# Patient Record
Sex: FEMALE | Race: WHITE | NOT HISPANIC OR LATINO | Employment: UNEMPLOYED
[De-identification: names, ages, dates, MRNs, and addresses within clinical notes are randomized per-mention and may not be internally consistent; named-entity substitution may affect disease eponyms.]

---

## 2017-06-03 ENCOUNTER — HEALTH MAINTENANCE LETTER (OUTPATIENT)
Age: 32
End: 2017-06-03

## 2017-08-29 ENCOUNTER — CARE COORDINATION (OUTPATIENT)
Dept: CARE COORDINATION | Facility: CLINIC | Age: 32
End: 2017-08-29

## 2019-07-19 LAB
HBV SURFACE AG SERPL QL IA: NEGATIVE
HIV 1+2 AB+HIV1 P24 AG SERPL QL IA: NON REACTIVE
RUBELLA ANTIBODY IGG QUANTITATIVE: NORMAL IU/ML

## 2020-01-17 LAB — GROUP B STREP PCR: POSITIVE

## 2020-02-05 NOTE — PHARMACY-ADMISSION MEDICATION HISTORY
Medication reconciliation completed by pre-admitting.    Prior to Admission medications    Medication Sig Last Dose Taking? Auth Provider   acetaminophen 650 MG TABS Take 650 mg by mouth every 4 hours as needed for mild pain or fever (greater than or equal to 38  C /100.4  F (oral) or 38.5  C/ 101.4  F (core).)   Shanique Loya MD   Prenatal Vit-Fe Fumarate-FA (PRENATAL MULTIVITAMIN  PLUS IRON) 27-0.8 MG TABS Take 1 tablet by mouth daily   Reported, Patient

## 2020-02-06 ENCOUNTER — HOSPITAL ENCOUNTER (OUTPATIENT)
Dept: LAB | Facility: CLINIC | Age: 35
Discharge: HOME | End: 2020-02-06
Attending: OBSTETRICS & GYNECOLOGY | Admitting: OBSTETRICS & GYNECOLOGY
Payer: MEDICAID

## 2020-02-06 DIAGNOSIS — Z01.812 PRE-OPERATIVE LABORATORY EXAMINATION: ICD-10-CM

## 2020-02-06 LAB
ABO + RH BLD: NORMAL
ABO + RH BLD: NORMAL
BLD GP AB SCN SERPL QL: NORMAL
BLOOD BANK CMNT PATIENT-IMP: NORMAL
HGB BLD-MCNC: 11.4 G/DL (ref 11.7–15.7)
SPECIMEN EXP DATE BLD: NORMAL

## 2020-02-06 PROCEDURE — 86850 RBC ANTIBODY SCREEN: CPT | Performed by: OBSTETRICS & GYNECOLOGY

## 2020-02-06 PROCEDURE — 86901 BLOOD TYPING SEROLOGIC RH(D): CPT | Performed by: OBSTETRICS & GYNECOLOGY

## 2020-02-06 PROCEDURE — 85018 HEMOGLOBIN: CPT | Performed by: OBSTETRICS & GYNECOLOGY

## 2020-02-06 PROCEDURE — 86780 TREPONEMA PALLIDUM: CPT | Performed by: OBSTETRICS & GYNECOLOGY

## 2020-02-06 PROCEDURE — 36415 COLL VENOUS BLD VENIPUNCTURE: CPT | Performed by: OBSTETRICS & GYNECOLOGY

## 2020-02-06 PROCEDURE — 86900 BLOOD TYPING SEROLOGIC ABO: CPT | Performed by: OBSTETRICS & GYNECOLOGY

## 2020-02-07 ENCOUNTER — ANESTHESIA (OUTPATIENT)
Dept: OBGYN | Facility: CLINIC | Age: 35
End: 2020-02-07
Payer: MEDICAID

## 2020-02-07 ENCOUNTER — ANESTHESIA EVENT (OUTPATIENT)
Dept: OBGYN | Facility: CLINIC | Age: 35
End: 2020-02-07
Payer: MEDICAID

## 2020-02-07 ENCOUNTER — HOSPITAL ENCOUNTER (INPATIENT)
Facility: CLINIC | Age: 35
LOS: 3 days | Discharge: HOME HEALTH | End: 2020-02-10
Attending: OBSTETRICS & GYNECOLOGY | Admitting: OBSTETRICS & GYNECOLOGY
Payer: MEDICAID

## 2020-02-07 DIAGNOSIS — Z98.891 S/P CESAREAN SECTION: Primary | ICD-10-CM

## 2020-02-07 LAB — T PALLIDUM AB SER QL: NONREACTIVE

## 2020-02-07 PROCEDURE — 86780 TREPONEMA PALLIDUM: CPT | Performed by: OBSTETRICS & GYNECOLOGY

## 2020-02-07 PROCEDURE — 25000128 H RX IP 250 OP 636: Performed by: OBSTETRICS & GYNECOLOGY

## 2020-02-07 PROCEDURE — 25000128 H RX IP 250 OP 636: Performed by: NURSE ANESTHETIST, CERTIFIED REGISTERED

## 2020-02-07 PROCEDURE — 71000012 ZZH RECOVERY PHASE 1 LEVEL 1 FIRST HR: Performed by: OBSTETRICS & GYNECOLOGY

## 2020-02-07 PROCEDURE — 25000125 ZZHC RX 250: Performed by: OBSTETRICS & GYNECOLOGY

## 2020-02-07 PROCEDURE — 37000009 ZZH ANESTHESIA TECHNICAL FEE, EACH ADDTL 15 MIN: Performed by: OBSTETRICS & GYNECOLOGY

## 2020-02-07 PROCEDURE — 0UB70ZZ EXCISION OF BILATERAL FALLOPIAN TUBES, OPEN APPROACH: ICD-10-PCS | Performed by: OBSTETRICS & GYNECOLOGY

## 2020-02-07 PROCEDURE — 36000056 ZZH SURGERY LEVEL 3 1ST 30 MIN: Performed by: OBSTETRICS & GYNECOLOGY

## 2020-02-07 PROCEDURE — 25000125 ZZHC RX 250: Performed by: NURSE ANESTHETIST, CERTIFIED REGISTERED

## 2020-02-07 PROCEDURE — 25000128 H RX IP 250 OP 636: Performed by: ANESTHESIOLOGY

## 2020-02-07 PROCEDURE — 25800030 ZZH RX IP 258 OP 636: Performed by: OBSTETRICS & GYNECOLOGY

## 2020-02-07 PROCEDURE — 88302 TISSUE EXAM BY PATHOLOGIST: CPT | Mod: 26 | Performed by: OBSTETRICS & GYNECOLOGY

## 2020-02-07 PROCEDURE — 25000132 ZZH RX MED GY IP 250 OP 250 PS 637: Performed by: OBSTETRICS & GYNECOLOGY

## 2020-02-07 PROCEDURE — 36000058 ZZH SURGERY LEVEL 3 EA 15 ADDTL MIN: Performed by: OBSTETRICS & GYNECOLOGY

## 2020-02-07 PROCEDURE — 37000008 ZZH ANESTHESIA TECHNICAL FEE, 1ST 30 MIN: Performed by: OBSTETRICS & GYNECOLOGY

## 2020-02-07 PROCEDURE — 12000000 ZZH R&B MED SURG/OB

## 2020-02-07 PROCEDURE — 88302 TISSUE EXAM BY PATHOLOGIST: CPT | Performed by: OBSTETRICS & GYNECOLOGY

## 2020-02-07 PROCEDURE — 27210794 ZZH OR GENERAL SUPPLY STERILE: Performed by: OBSTETRICS & GYNECOLOGY

## 2020-02-07 RX ORDER — HYDROMORPHONE HYDROCHLORIDE 1 MG/ML
.3-.5 INJECTION, SOLUTION INTRAMUSCULAR; INTRAVENOUS; SUBCUTANEOUS EVERY 10 MIN PRN
Status: DISCONTINUED | OUTPATIENT
Start: 2020-02-07 | End: 2020-02-07

## 2020-02-07 RX ORDER — NALOXONE HYDROCHLORIDE 0.4 MG/ML
.1-.4 INJECTION, SOLUTION INTRAMUSCULAR; INTRAVENOUS; SUBCUTANEOUS
Status: DISCONTINUED | OUTPATIENT
Start: 2020-02-07 | End: 2020-02-08

## 2020-02-07 RX ORDER — NALBUPHINE HYDROCHLORIDE 20 MG/ML
2.5-5 INJECTION, SOLUTION INTRAMUSCULAR; INTRAVENOUS; SUBCUTANEOUS EVERY 6 HOURS PRN
Status: DISCONTINUED | OUTPATIENT
Start: 2020-02-07 | End: 2020-02-07

## 2020-02-07 RX ORDER — ACETAMINOPHEN 325 MG/1
650 TABLET ORAL EVERY 4 HOURS PRN
Status: DISCONTINUED | OUTPATIENT
Start: 2020-02-10 | End: 2020-02-10 | Stop reason: HOSPADM

## 2020-02-07 RX ORDER — BUPIVACAINE HYDROCHLORIDE 7.5 MG/ML
INJECTION, SOLUTION INTRASPINAL PRN
Status: DISCONTINUED | OUTPATIENT
Start: 2020-02-07 | End: 2020-02-07

## 2020-02-07 RX ORDER — FENTANYL CITRATE 50 UG/ML
25-50 INJECTION, SOLUTION INTRAMUSCULAR; INTRAVENOUS EVERY 5 MIN PRN
Status: DISCONTINUED | OUTPATIENT
Start: 2020-02-07 | End: 2020-02-07 | Stop reason: HOSPADM

## 2020-02-07 RX ORDER — ONDANSETRON 2 MG/ML
4 INJECTION INTRAMUSCULAR; INTRAVENOUS EVERY 30 MIN PRN
Status: DISCONTINUED | OUTPATIENT
Start: 2020-02-07 | End: 2020-02-08 | Stop reason: CLARIF

## 2020-02-07 RX ORDER — OXYTOCIN/0.9 % SODIUM CHLORIDE 30/500 ML
100 PLASTIC BAG, INJECTION (ML) INTRAVENOUS CONTINUOUS
Status: DISCONTINUED | OUTPATIENT
Start: 2020-02-07 | End: 2020-02-10 | Stop reason: HOSPADM

## 2020-02-07 RX ORDER — CEFAZOLIN SODIUM 1 G/3ML
1 INJECTION, POWDER, FOR SOLUTION INTRAMUSCULAR; INTRAVENOUS SEE ADMIN INSTRUCTIONS
Status: DISCONTINUED | OUTPATIENT
Start: 2020-02-07 | End: 2020-02-07

## 2020-02-07 RX ORDER — OXYCODONE HYDROCHLORIDE 5 MG/1
5-10 TABLET ORAL
Status: DISCONTINUED | OUTPATIENT
Start: 2020-02-07 | End: 2020-02-10 | Stop reason: HOSPADM

## 2020-02-07 RX ORDER — SIMETHICONE 80 MG
80 TABLET,CHEWABLE ORAL 4 TIMES DAILY PRN
Status: DISCONTINUED | OUTPATIENT
Start: 2020-02-07 | End: 2020-02-10 | Stop reason: HOSPADM

## 2020-02-07 RX ORDER — CEFAZOLIN SODIUM 2 G/100ML
2 INJECTION, SOLUTION INTRAVENOUS
Status: COMPLETED | OUTPATIENT
Start: 2020-02-07 | End: 2020-02-07

## 2020-02-07 RX ORDER — SODIUM CHLORIDE, SODIUM LACTATE, POTASSIUM CHLORIDE, CALCIUM CHLORIDE 600; 310; 30; 20 MG/100ML; MG/100ML; MG/100ML; MG/100ML
INJECTION, SOLUTION INTRAVENOUS CONTINUOUS
Status: DISCONTINUED | OUTPATIENT
Start: 2020-02-07 | End: 2020-02-08 | Stop reason: CLARIF

## 2020-02-07 RX ORDER — FENTANYL CITRATE 50 UG/ML
INJECTION, SOLUTION INTRAMUSCULAR; INTRAVENOUS PRN
Status: DISCONTINUED | OUTPATIENT
Start: 2020-02-07 | End: 2020-02-07

## 2020-02-07 RX ORDER — PRENATAL VIT/IRON FUM/FOLIC AC 27MG-0.8MG
1 TABLET ORAL DAILY
Status: DISCONTINUED | OUTPATIENT
Start: 2020-02-07 | End: 2020-02-10 | Stop reason: HOSPADM

## 2020-02-07 RX ORDER — DEXAMETHASONE SODIUM PHOSPHATE 4 MG/ML
INJECTION, SOLUTION INTRA-ARTICULAR; INTRALESIONAL; INTRAMUSCULAR; INTRAVENOUS; SOFT TISSUE PRN
Status: DISCONTINUED | OUTPATIENT
Start: 2020-02-07 | End: 2020-02-07

## 2020-02-07 RX ORDER — HYDROMORPHONE HYDROCHLORIDE 1 MG/ML
.3-.5 INJECTION, SOLUTION INTRAMUSCULAR; INTRAVENOUS; SUBCUTANEOUS EVERY 30 MIN PRN
Status: DISCONTINUED | OUTPATIENT
Start: 2020-02-07 | End: 2020-02-10 | Stop reason: HOSPADM

## 2020-02-07 RX ORDER — OXYTOCIN/0.9 % SODIUM CHLORIDE 30/500 ML
PLASTIC BAG, INJECTION (ML) INTRAVENOUS PRN
Status: DISCONTINUED | OUTPATIENT
Start: 2020-02-07 | End: 2020-02-07

## 2020-02-07 RX ORDER — ONDANSETRON 2 MG/ML
4 INJECTION INTRAMUSCULAR; INTRAVENOUS EVERY 6 HOURS PRN
Status: DISCONTINUED | OUTPATIENT
Start: 2020-02-07 | End: 2020-02-10 | Stop reason: HOSPADM

## 2020-02-07 RX ORDER — OXYTOCIN/0.9 % SODIUM CHLORIDE 30/500 ML
340 PLASTIC BAG, INJECTION (ML) INTRAVENOUS CONTINUOUS PRN
Status: DISCONTINUED | OUTPATIENT
Start: 2020-02-07 | End: 2020-02-10 | Stop reason: HOSPADM

## 2020-02-07 RX ORDER — HYDROCORTISONE 2.5 %
CREAM (GRAM) TOPICAL 3 TIMES DAILY PRN
Status: DISCONTINUED | OUTPATIENT
Start: 2020-02-07 | End: 2020-02-10 | Stop reason: HOSPADM

## 2020-02-07 RX ORDER — ALBUTEROL SULFATE 0.83 MG/ML
2.5 SOLUTION RESPIRATORY (INHALATION) EVERY 4 HOURS PRN
Status: DISCONTINUED | OUTPATIENT
Start: 2020-02-07 | End: 2020-02-07 | Stop reason: HOSPADM

## 2020-02-07 RX ORDER — CITRIC ACID/SODIUM CITRATE 334-500MG
30 SOLUTION, ORAL ORAL
Status: COMPLETED | OUTPATIENT
Start: 2020-02-07 | End: 2020-02-07

## 2020-02-07 RX ORDER — ACETAMINOPHEN 325 MG/1
975 TABLET ORAL EVERY 8 HOURS
Status: COMPLETED | OUTPATIENT
Start: 2020-02-07 | End: 2020-02-10

## 2020-02-07 RX ORDER — SODIUM CHLORIDE, SODIUM LACTATE, POTASSIUM CHLORIDE, CALCIUM CHLORIDE 600; 310; 30; 20 MG/100ML; MG/100ML; MG/100ML; MG/100ML
INJECTION, SOLUTION INTRAVENOUS CONTINUOUS
Status: DISCONTINUED | OUTPATIENT
Start: 2020-02-07 | End: 2020-02-07

## 2020-02-07 RX ORDER — AMOXICILLIN 250 MG
2 CAPSULE ORAL 2 TIMES DAILY PRN
Status: DISCONTINUED | OUTPATIENT
Start: 2020-02-07 | End: 2020-02-10 | Stop reason: HOSPADM

## 2020-02-07 RX ORDER — MAGNESIUM HYDROXIDE 1200 MG/15ML
LIQUID ORAL PRN
Status: DISCONTINUED | OUTPATIENT
Start: 2020-02-07 | End: 2020-02-10 | Stop reason: HOSPADM

## 2020-02-07 RX ORDER — BISACODYL 10 MG
10 SUPPOSITORY, RECTAL RECTAL DAILY PRN
Status: DISCONTINUED | OUTPATIENT
Start: 2020-02-09 | End: 2020-02-10 | Stop reason: HOSPADM

## 2020-02-07 RX ORDER — MORPHINE SULFATE 1 MG/ML
INJECTION, SOLUTION EPIDURAL; INTRATHECAL; INTRAVENOUS PRN
Status: DISCONTINUED | OUTPATIENT
Start: 2020-02-07 | End: 2020-02-07

## 2020-02-07 RX ORDER — ONDANSETRON 2 MG/ML
INJECTION INTRAMUSCULAR; INTRAVENOUS PRN
Status: DISCONTINUED | OUTPATIENT
Start: 2020-02-07 | End: 2020-02-07

## 2020-02-07 RX ORDER — PHENYLEPHRINE HCL IN 0.9% NACL 1 MG/10 ML
100 SYRINGE (ML) INTRAVENOUS EVERY 5 MIN PRN
Status: DISCONTINUED | OUTPATIENT
Start: 2020-02-07 | End: 2020-02-07

## 2020-02-07 RX ORDER — DEXTROSE, SODIUM CHLORIDE, SODIUM LACTATE, POTASSIUM CHLORIDE, AND CALCIUM CHLORIDE 5; .6; .31; .03; .02 G/100ML; G/100ML; G/100ML; G/100ML; G/100ML
INJECTION, SOLUTION INTRAVENOUS CONTINUOUS
Status: DISCONTINUED | OUTPATIENT
Start: 2020-02-07 | End: 2020-02-10 | Stop reason: HOSPADM

## 2020-02-07 RX ORDER — OXYTOCIN 10 [USP'U]/ML
10 INJECTION, SOLUTION INTRAMUSCULAR; INTRAVENOUS
Status: DISCONTINUED | OUTPATIENT
Start: 2020-02-07 | End: 2020-02-10 | Stop reason: HOSPADM

## 2020-02-07 RX ORDER — NALBUPHINE HYDROCHLORIDE 20 MG/ML
5-10 INJECTION, SOLUTION INTRAMUSCULAR; INTRAVENOUS; SUBCUTANEOUS ONCE
Status: COMPLETED | OUTPATIENT
Start: 2020-02-07 | End: 2020-02-07

## 2020-02-07 RX ORDER — IBUPROFEN 800 MG/1
800 TABLET, FILM COATED ORAL EVERY 6 HOURS PRN
Status: DISCONTINUED | OUTPATIENT
Start: 2020-02-08 | End: 2020-02-10 | Stop reason: HOSPADM

## 2020-02-07 RX ORDER — ONDANSETRON 4 MG/1
4 TABLET, ORALLY DISINTEGRATING ORAL EVERY 30 MIN PRN
Status: DISCONTINUED | OUTPATIENT
Start: 2020-02-07 | End: 2020-02-08 | Stop reason: CLARIF

## 2020-02-07 RX ORDER — DIPHENHYDRAMINE HYDROCHLORIDE 50 MG/ML
25-50 INJECTION INTRAMUSCULAR; INTRAVENOUS EVERY 6 HOURS PRN
Status: DISCONTINUED | OUTPATIENT
Start: 2020-02-07 | End: 2020-02-10 | Stop reason: HOSPADM

## 2020-02-07 RX ORDER — LIDOCAINE 40 MG/G
CREAM TOPICAL
Status: DISCONTINUED | OUTPATIENT
Start: 2020-02-07 | End: 2020-02-10 | Stop reason: HOSPADM

## 2020-02-07 RX ORDER — AMOXICILLIN 250 MG
1 CAPSULE ORAL 2 TIMES DAILY PRN
Status: DISCONTINUED | OUTPATIENT
Start: 2020-02-07 | End: 2020-02-10 | Stop reason: HOSPADM

## 2020-02-07 RX ORDER — LANOLIN 100 %
OINTMENT (GRAM) TOPICAL
Status: DISCONTINUED | OUTPATIENT
Start: 2020-02-07 | End: 2020-02-10 | Stop reason: HOSPADM

## 2020-02-07 RX ORDER — MEPERIDINE HYDROCHLORIDE 50 MG/ML
12.5 INJECTION INTRAMUSCULAR; INTRAVENOUS; SUBCUTANEOUS
Status: DISCONTINUED | OUTPATIENT
Start: 2020-02-07 | End: 2020-02-08 | Stop reason: CLARIF

## 2020-02-07 RX ORDER — LIDOCAINE 40 MG/G
CREAM TOPICAL
Status: DISCONTINUED | OUTPATIENT
Start: 2020-02-07 | End: 2020-02-07

## 2020-02-07 RX ORDER — NALOXONE HYDROCHLORIDE 0.4 MG/ML
.1-.4 INJECTION, SOLUTION INTRAMUSCULAR; INTRAVENOUS; SUBCUTANEOUS
Status: DISCONTINUED | OUTPATIENT
Start: 2020-02-07 | End: 2020-02-07

## 2020-02-07 RX ORDER — KETOROLAC TROMETHAMINE 30 MG/ML
30 INJECTION, SOLUTION INTRAMUSCULAR; INTRAVENOUS EVERY 6 HOURS
Status: COMPLETED | OUTPATIENT
Start: 2020-02-07 | End: 2020-02-08

## 2020-02-07 RX ORDER — PHENYLEPHRINE HYDROCHLORIDE 10 MG/ML
INJECTION INTRAVENOUS PRN
Status: DISCONTINUED | OUTPATIENT
Start: 2020-02-07 | End: 2020-02-07

## 2020-02-07 RX ORDER — NALOXONE HYDROCHLORIDE 0.4 MG/ML
.1-.4 INJECTION, SOLUTION INTRAMUSCULAR; INTRAVENOUS; SUBCUTANEOUS
Status: DISCONTINUED | OUTPATIENT
Start: 2020-02-07 | End: 2020-02-10 | Stop reason: HOSPADM

## 2020-02-07 RX ORDER — FENTANYL CITRATE 50 UG/ML
25-50 INJECTION, SOLUTION INTRAMUSCULAR; INTRAVENOUS
Status: DISCONTINUED | OUTPATIENT
Start: 2020-02-07 | End: 2020-02-08 | Stop reason: CLARIF

## 2020-02-07 RX ADMIN — DEXAMETHASONE SODIUM PHOSPHATE 4 MG: 4 INJECTION, SOLUTION INTRA-ARTICULAR; INTRALESIONAL; INTRAMUSCULAR; INTRAVENOUS; SOFT TISSUE at 07:55

## 2020-02-07 RX ADMIN — BUPIVACAINE HYDROCHLORIDE IN DEXTROSE 12 MG: 7.5 INJECTION, SOLUTION SUBARACHNOID at 07:47

## 2020-02-07 RX ADMIN — KETOROLAC TROMETHAMINE 30 MG: 30 INJECTION, SOLUTION INTRAMUSCULAR at 22:04

## 2020-02-07 RX ADMIN — PHENYLEPHRINE HYDROCHLORIDE 50 MCG: 10 INJECTION INTRAVENOUS at 07:56

## 2020-02-07 RX ADMIN — ONDANSETRON 4 MG: 2 INJECTION INTRAMUSCULAR; INTRAVENOUS at 07:45

## 2020-02-07 RX ADMIN — FENTANYL CITRATE 15 MCG: 50 INJECTION, SOLUTION INTRAMUSCULAR; INTRAVENOUS at 07:47

## 2020-02-07 RX ADMIN — Medication 0.15 MG: at 07:47

## 2020-02-07 RX ADMIN — KETOROLAC TROMETHAMINE 30 MG: 30 INJECTION, SOLUTION INTRAMUSCULAR at 10:08

## 2020-02-07 RX ADMIN — SENNOSIDES AND DOCUSATE SODIUM 1 TABLET: 8.6; 5 TABLET ORAL at 22:04

## 2020-02-07 RX ADMIN — OXYTOCIN-SODIUM CHLORIDE 0.9% IV SOLN 30 UNIT/500ML 500 ML: 30-0.9/5 SOLUTION at 08:11

## 2020-02-07 RX ADMIN — ACETAMINOPHEN 975 MG: 325 TABLET, FILM COATED ORAL at 17:26

## 2020-02-07 RX ADMIN — SODIUM CHLORIDE, SODIUM LACTATE, POTASSIUM CHLORIDE, CALCIUM CHLORIDE AND DEXTROSE MONOHYDRATE: 5; 600; 310; 30; 20 INJECTION, SOLUTION INTRAVENOUS at 12:09

## 2020-02-07 RX ADMIN — KETOROLAC TROMETHAMINE 30 MG: 30 INJECTION, SOLUTION INTRAMUSCULAR at 16:08

## 2020-02-07 RX ADMIN — SODIUM CHLORIDE, POTASSIUM CHLORIDE, SODIUM LACTATE AND CALCIUM CHLORIDE 1000 ML: 600; 310; 30; 20 INJECTION, SOLUTION INTRAVENOUS at 06:15

## 2020-02-07 RX ADMIN — ACETAMINOPHEN 975 MG: 325 TABLET, FILM COATED ORAL at 10:08

## 2020-02-07 RX ADMIN — SODIUM CHLORIDE, POTASSIUM CHLORIDE, SODIUM LACTATE AND CALCIUM CHLORIDE 500 ML: 600; 310; 30; 20 INJECTION, SOLUTION INTRAVENOUS at 13:47

## 2020-02-07 RX ADMIN — PHENYLEPHRINE HYDROCHLORIDE 50 MCG: 10 INJECTION INTRAVENOUS at 08:02

## 2020-02-07 RX ADMIN — SODIUM CITRATE AND CITRIC ACID MONOHYDRATE 30 ML: 500; 334 SOLUTION ORAL at 07:12

## 2020-02-07 RX ADMIN — NALBUPHINE HYDROCHLORIDE 5 MG: 20 INJECTION, SOLUTION INTRAMUSCULAR; INTRAVENOUS; SUBCUTANEOUS at 13:56

## 2020-02-07 RX ADMIN — SODIUM CHLORIDE, POTASSIUM CHLORIDE, SODIUM LACTATE AND CALCIUM CHLORIDE: 600; 310; 30; 20 INJECTION, SOLUTION INTRAVENOUS at 07:12

## 2020-02-07 RX ADMIN — CEFAZOLIN SODIUM 2 G: 2 INJECTION, SOLUTION INTRAVENOUS at 07:31

## 2020-02-07 NOTE — ANESTHESIA POSTPROCEDURE EVALUATION
Patient: Bree Alexis    Procedure(s):  REPEAT  SECTION, WITH BILATERAL SALPINGECTOMY    Diagnosis:Previous  section [Z98.891]  Sterilization [Z30.2]  Diagnosis Additional Information: No value filed.    Anesthesia Type:  Spinal    Note:  Anesthesia Post Evaluation    Patient location during evaluation: OB PACU  Patient participation: Able to participate in evaluation but full recovery from regional anesthesia has not yet ocurrred but is anticipated to occur within 48 hours  Level of consciousness: awake and alert  Pain management: adequate  Airway patency: patent  Cardiovascular status: acceptable  Respiratory status: acceptable  Hydration status: acceptable  PONV: controlled             Last vitals:  There were no vitals filed for this visit.      Electronically Signed By: Remigio Patterson MD  2020  8:58 AM

## 2020-02-07 NOTE — ANESTHESIA PREPROCEDURE EVALUATION
Anesthesia Pre-Procedure Evaluation    Patient: Bree Alexis   MRN: [id number] : 1985          Preoperative Diagnosis: Previous  section [Z98.891]  Sterilization [Z30.2]    Procedure(s):  REPEAT  SECTION, WITH BILATERAL SALPINGECTOMY    History reviewed. No pertinent past medical history.  Past Surgical History:   Procedure Laterality Date      SECTION        SECTION N/A 2016    Procedure:  SECTION;  Surgeon: Mell Omer MD;  Location: [facility] L+[facility]     TONSILLECTOMY & ADENOIDECTOMY       Anesthesia Evaluation     . Pt has had prior anesthetic. Type: Regional    No history of anesthetic complications          ROS/MED HX    ENT/Pulmonary:  - neg pulmonary ROS    (-) asthma   Neurologic:  - neg neurologic ROS    (-) Other neuro hx and Neuropathy   Cardiovascular:  - neg cardiovascular ROS      (-) hypertension and syncope   METS/Exercise Tolerance:     Hematologic:  - neg hematologic  ROS       Musculoskeletal:  - neg musculoskeletal ROS       GI/Hepatic:  - neg GI/hepatic ROS       Renal/Genitourinary:  - ROS Renal section negative       Endo:     (+) Obesity, .      Psychiatric:         Infectious Disease:         Malignancy:      - no malignancy   Other:    (+) Possibly pregnant                         Physical Exam  Normal systems: cardiovascular, pulmonary and dental    Airway   Mallampati: II  TM distance: >3 FB  Neck ROM: full    Dental     Cardiovascular       Pulmonary             Lab Results   Component Value Date    WBC 6.0 10/16/2014    HGB 11.4 (L) 2020    HCT 39.8 10/16/2014     10/16/2014     10/16/2014    POTASSIUM 3.8 10/16/2014    CHLORIDE 106 10/16/2014    CO2 25 10/16/2014    BUN 10 10/16/2014    CR 0.70 10/16/2014    GLC 85 10/16/2014    JASMIN 9.2 10/16/2014    MAG 1.9 2012    ALBUMIN 4.4 10/16/2014    PROTTOTAL 7.8 10/16/2014    ALT 34 10/16/2014    AST 21 10/16/2014    ALKPHOS 47 10/16/2014    BILITOTAL 0.8 10/16/2014     "PTT 24 07/06/2007    INR 1.04 07/06/2007    TSH 0.67 10/16/2014    HCGS Negative 07/06/2007       Preop Vitals  BP Readings from Last 3 Encounters:   02/19/16 134/88   12/18/14 110/70   10/16/14 120/74    Pulse Readings from Last 3 Encounters:   02/19/16 74   12/18/14 54   10/16/14 74      Resp Readings from Last 3 Encounters:   02/19/16 16   12/18/14 10   10/16/14 14    SpO2 Readings from Last 3 Encounters:   02/17/16 96%   06/14/13 98%   07/03/12 98%      Temp Readings from Last 1 Encounters:   02/19/16 97.9  F (36.6  C) (Oral)    Ht Readings from Last 1 Encounters:   02/09/16 1.651 m (5' 5\")      Wt Readings from Last 1 Encounters:   02/09/16 111.1 kg (245 lb)    Estimated body mass index is 40.77 kg/m  as calculated from the following:    Height as of 2/9/16: 1.651 m (5' 5\").    Weight as of 2/9/16: 111.1 kg (245 lb).       Anesthesia Plan      History & Physical Review  History and physical reviewed and following examination; no interval change.    ASA Status:  2 .    NPO Status:  > 8 hours    Plan for Spinal   PONV prophylaxis:  Ondansetron (or other 5HT-3) and Dexamethasone or Solumedrol       Postoperative Care  Postoperative pain management:  IV analgesics and Neuraxial analgesia.      Consents  Anesthetic plan, risks, benefits and alternatives discussed with:  Patient..                 Remigio Patterson MD                    .  "

## 2020-02-07 NOTE — PROVIDER NOTIFICATION
02/07/20 1130   Provider Notification   Provider Name/Title Dr. DEE Omer   Method of Notification Phone   Request Evaluate-Remote   Notification Reason Status Update   MD updated on patient itching.  Orders received for either Nubain 5-10mg once prn or benadryl 25-50 mg prn.

## 2020-02-07 NOTE — PLAN OF CARE
Vital signs stable on room air. Bonding well with infant. Pt has had many visitors today.  at bedside and supportive. Breastfeeding well with minimal assistance from staff. Tolerating clear liquids and saltine crackers, diet advanced, pt educated about eating bland and easy foods, denies nausea. Pt itchy, nubain given. Pt had insufficient output in her hammer, md notified and a bolus of 500 ml LR was given per MD orders, output has increased. Pt has not ambulated yet. Some moist drainage under tegaderm.

## 2020-02-07 NOTE — ANESTHESIA CARE TRANSFER NOTE
Patient: Bree Alexis    Procedure(s):  REPEAT  SECTION, WITH BILATERAL SALPINGECTOMY    Diagnosis: Previous  section [Z98.891]  Sterilization [Z30.2]  Diagnosis Additional Information: No value filed.    Anesthesia Type:   Spinal     Note:  Airway :Room Air  Patient transferred to:Labor and Delivery  Comments: VSS.  Spontaneously breathing room air.  Report given to RNHandoff Report: Identifed the Patient, Identified the Reponsible Provider, Reviewed the pertinent medical history, Discussed the surgical course, Reviewed Intra-OP anesthesia mangement and issues during anesthesia, Set expectations for post-procedure period and Allowed opportunity for questions and acknowledgement of understanding      Vitals: (Last set prior to Anesthesia Care Transfer)    CRNA VITALS  2020 0817 - 2020 0852      2020             Pulse:  75    SpO2:  96 %                Electronically Signed By: CALI Stern CRNA  2020  8:52 AM

## 2020-02-07 NOTE — ANESTHESIA PROCEDURE NOTES
Peripheral nerve/Neuraxial procedure note : intrathecal  Pre-Procedure  Performed by Remigio Patterson MD  Location: OB    Procedure Times:2/7/2020 7:34 AM and 2/7/2020 7:47 AM  Pre-Anesthestic Checklist: patient identified, IV checked, risks and benefits discussed, informed consent, monitors and equipment checked, pre-op evaluation and at physician/surgeon's request    Timeout  Correct Patient: Yes   Correct Procedure: Yes   Correct Site: Yes   Correct Laterality: N/A   Correct Position: Yes   Site Marked: N/A   .   Procedure Documentation  ASA 2  Diagnosis:c section.    Procedure: intrathecal, .   Patient Position:sitting Insertion Site:L4-5  (midline approach)     Patient Prep/Sterile Barriers; mask, sterile gloves, povidone-iodine 7.5% surgical scrub, patient draped.  .  Needle:  Spinal Needle (gauge): 25  Spinal/LP Needle Length (inches): 3.5 # of attempts: 3 (CSF on attempt 1 and 2 without good CSF flow) and # of redirects:  Introducer used Introducer: 20 G .        Assessment/Narrative  Paresthesias: No.  .  .  clear CSF fluid removed .

## 2020-02-07 NOTE — BRIEF OP NOTE
[facility] Brief Operative Note    Pre-operative diagnosis: Previous  section, desiring permanent sterilization  Sterilization [Z30.2]   Post-operative diagnosis Same   Procedure: Procedure(s):  REPEAT  SECTION, WITH BILATERAL SALPINGECTOMY   Surgeon(s): Surgeon(s) and Role:     * Terri Omer MD - Primary     * Juliana Morrison PA-C   Estimated blood loss: 914 cc   Specimens: ID Type Source Tests Collected by Time Destination   1 :  Cord blood Blood OR DOCUMENTATION ONLY Terri Omer MD 2020  8:09 AM    2 :  Tissue Fallopian Tube, Bilateral SEE PROVIDERS ORDERS Terri Omer MD 2020  8:26 AM       Findings: Liveborn male infant, 9#1, 8/9 Apgars

## 2020-02-07 NOTE — OP NOTE
Procedure Date: 2020      PREOPERATIVE DIAGNOSIS:   A 39-week gestation, previous  section, desiring permanent sterilization.      POSTOPERATIVE DIAGNOSIS:   A 39-week gestation, previous  section, desiring permanent sterilization.      PROCEDURE:  Repeat low transverse  section, bilateral salpingectomy.      SURGEON:  Terri Omer MD      ASSISTANT:  ALICIA Matias, [facility]      ANESTHESIA:  Spinal.      QUANTITATIVE BLOOD LOSS:  914 mL.      FINDINGS:  Liveborn male infant, weight was 9 pounds 1 ounce, Apgars were 8 and 9 at 1 and 5 minutes respectively.  The uterus, tubes and ovaries appeared normal.      HISTORY:  The patient is a 34-year-old  3, para 2 who presents at 39 weeks' gestation for a scheduled repeat  section.  Her prenatal course was uncomplicated.  Nonstress test was reactive on presentation.  She had been counseled on permanent sterilization and desired bilateral salpingectomy.  Risks of permanence, irreversibility and risk of failure were discussed and informed consent was obtained.      PROCEDURE:  After obtaining informed consent, the patient was taken to the operating room where she was prepped and draped in the usual sterile fashion and placed in the dorsal supine position with a leftward tilt.  A Pfannenstiel skin incision was made through the prior scar with a scalpel.  Cautery was used to dissect down to fascia.  The fascia was incised at midline, and the fascial incision was extended laterally with Koenig scissors.  The rectus muscles were noted to be encased in a fibrous scar.  This was taken down with cautery.  The superior edge of the fascia was grasped with Kocher clamps and elevated and the rectus muscles were cauterized to separate from fascia.  This was repeated inferiorly.  The rectus muscles were  at midline with blunt dissection and the peritoneum was entered with blunt dissection.  The peritoneal incision was extended with  cautery.  The bladder was noted to be well off of the lower uterine segment.  A transverse lower uterine segment smile incision was made with a scalpel until clear amniotic fluid was noted.  The uterine incision was extended using gentle traction in the cephalocaudal direction.  The fetal vertex was elevated out of the pelvis.  Due to its large size, it was tight in delivering through the incision.  Using gentle fundal pressure, initial attempts to deliver the head were unsuccessful.  The Kiwi vacuum was then called for.  The Kiwi Omni cup vacuum was then placed at the mid sagittal suture just anterior to the posterior fontanelle.  Suction was applied to the green zone and an exam was performed to confirm placement.  Using gentle fundal pressure with a single attempt, the fetal vertex delivered easily through the incision and the  vacuum was released.  There was no nuchal cord.  The anterior and posterior shoulders delivered easily with the remainder of the body following spontaneously.  The infant was placed on the patient's abdomen where he was immediately vigorous and crying.  Cord clamping was delayed by 1 minute, at which time the cord was doubly clamped and cut and the infant was transferred to the warmer.  The placenta was expressed and found to be intact with a 3-vessel cord.  The uterus was cleared of all clots and debris.  Due to the large abdominal pannus the uterus was exteriorized.  The uterine incision was reapproximated with 0 Vicryl in a continuous locked fashion.  A second imbricating layer was placed with 0 Vicryl.  The tubes and ovaries appeared normal.  The patient's desire for permanent sterilization was confirmed.  The right fallopian tube was elevated with a Brimson clamp and the mesosalpinx was cauterized with the handheld LigaSure device.  Sequential bites were taken along the mesosalpinx.  The right fallopian tube was then transected at the cornua.  This was repeated for the left fallopian  tube where the mesosalpinx was cauterized and transected with the LigaSure device.  The left fallopian tube was then transected at the cornua.  The uterus was returned to the abdomen and the gutters were cleared of all clots and debris.  The subfascial layers were inspected and noted to be hemostatic.  The fascia was then closed with 0 Vicryl in 2 running lengths.  Subcutaneous layer was closed with 2-0 Vicryl.  The skin was then closed with Insorb staples.  The patient tolerated the procedure without difficulty.  Sponge, lap and needle counts were correct.  The physician assistant's help was necessary due to the repeat nature of the  section as well as maternal obesity; assistance was needed with retraction.         JO ANN TONEY MD             D: 2020   T: 2020   MT: VANGIE      Name:     JULEE TEMPLE   MRN:      [id number]        Account:        [id number]   :      1985           Procedure Date: 2020      Document: [id number]

## 2020-02-07 NOTE — PLAN OF CARE
Patient here for repeat . Medical history reviewed. Labs were drawn yesterday. Category 1 tracing. No contractions.

## 2020-02-08 LAB — HGB BLD-MCNC: 9.8 G/DL (ref 11.7–15.7)

## 2020-02-08 PROCEDURE — 12000000 ZZH R&B MED SURG/OB

## 2020-02-08 PROCEDURE — 25000132 ZZH RX MED GY IP 250 OP 250 PS 637: Performed by: OBSTETRICS & GYNECOLOGY

## 2020-02-08 PROCEDURE — 85018 HEMOGLOBIN: CPT | Performed by: OBSTETRICS & GYNECOLOGY

## 2020-02-08 PROCEDURE — 25000128 H RX IP 250 OP 636: Performed by: OBSTETRICS & GYNECOLOGY

## 2020-02-08 PROCEDURE — 36415 COLL VENOUS BLD VENIPUNCTURE: CPT | Performed by: OBSTETRICS & GYNECOLOGY

## 2020-02-08 RX ADMIN — IBUPROFEN 800 MG: 800 TABLET ORAL at 17:29

## 2020-02-08 RX ADMIN — ACETAMINOPHEN 975 MG: 325 TABLET, FILM COATED ORAL at 18:40

## 2020-02-08 RX ADMIN — PRENATAL VITAMINS-IRON FUMARATE 27 MG IRON-FOLIC ACID 0.8 MG TABLET 1 TABLET: at 10:30

## 2020-02-08 RX ADMIN — ACETAMINOPHEN 975 MG: 325 TABLET, FILM COATED ORAL at 10:30

## 2020-02-08 RX ADMIN — OXYCODONE HYDROCHLORIDE 5 MG: 5 TABLET ORAL at 21:46

## 2020-02-08 RX ADMIN — ACETAMINOPHEN 975 MG: 325 TABLET, FILM COATED ORAL at 02:02

## 2020-02-08 RX ADMIN — IBUPROFEN 800 MG: 800 TABLET ORAL at 10:30

## 2020-02-08 RX ADMIN — KETOROLAC TROMETHAMINE 30 MG: 30 INJECTION, SOLUTION INTRAMUSCULAR at 04:08

## 2020-02-08 RX ADMIN — OXYCODONE HYDROCHLORIDE 5 MG: 5 TABLET ORAL at 15:20

## 2020-02-08 RX ADMIN — SENNOSIDES AND DOCUSATE SODIUM 1 TABLET: 8.6; 5 TABLET ORAL at 21:16

## 2020-02-08 RX ADMIN — OXYCODONE HYDROCHLORIDE 5 MG: 5 TABLET ORAL at 18:45

## 2020-02-08 NOTE — PLAN OF CARE
Patients mobililty level scored using the bedside mobility assistance tool (BMAT). Patient is at a mobility level test number: 3. Mobility equipment used: none required. Required assist of 1 staff members. Further use of BMAT scoring required.Patient was up and ambulated to the restroom and had first post delivery void of 250 ml. Patient tolerated it well, no pain dizziness or lightheadedness. Patient advised to call for assistance to use the restroom.

## 2020-02-08 NOTE — PLAN OF CARE
Patient VS stable and WNL. Patient is ambulating and voiding independently  Patient is resting with no further requests at this time.  Tegaderm dressing changed at change of shift per MD request.

## 2020-02-08 NOTE — PLAN OF CARE
Data: Vital signs within normal limits. Postpartum checks within normal limits, see flow record. Patient eating and drinking normally. Patient able to empty bladder independently and is up ambulating. No apparent signs of infection. Incision healing well, there is some serosanguinous drainage but covered by the barrier film. Abdominal binder given for support. Patient performing self cares and is able to care for infant.  Action: Patient medicated during the shift for pain and cramping. See MAR. Patient reassessed within 1 hour after each medication and pain was improved, patient stated she was comfortable. Interdry applied over incision to keep area dry due to pannus overlapping incision. Patient education done about safe infant sleep,breastfeeding cues and satiety, pain management, and self cares. See flow record.  Response: Positive attachment behaviors observed with infant. Support person Jaime present.   Plan: Anticipate discharge on 02/10/2020. Patient interested in early discharge, if possible.     Patients mobililty level scored using the bedside mobility assistance tool (BMAT). Patient is at a mobility level test number: 4. Mobility equipment used: none required. Required assist of 1 staff members. Further use of BMAT scoring not required. Patient advised to call for bathroom assistance due to pneumatic compression boots and pulse oximeter being in place.

## 2020-02-08 NOTE — PLAN OF CARE
Vital signs stable on room air. Bonding well with infant.  at bedside and supportive. Breastfeeding well with minimal assistance from staff. Ambulating independently. Tolerating a regular diet. Tegaderm changed per MD request. Voiding spontaneously. Incision is well approximated.

## 2020-02-08 NOTE — PROGRESS NOTES
[facility]   Obstetrics Progress Note    Subjective: This is the patient's first day since  delivery. She is doing well.  She is urinating on her own. Pain is controlled with medication.    Objective:   All vitals stable  Temp: 98.2  F (36.8  C) Temp src: Oral BP: 110/61 Pulse: 67   Resp: 16 SpO2: 97 % O2 Device: None (Room air)      EXAM:  Constitutional: healthy, alert, no distress.   Abdomen: Abdomen soft, non-tender. BS normal. No masses, fundus is firm.  JOINT/EXTREMITIES: extremities normal     Last hemoglobin was   Hemoglobin   Date Value Ref Range Status   2020 9.8 (L) 11.7 - 15.7 g/dL Final   ]    Assessment: Stable postpartum course.    Plan: Routine care. Ambulation encouraged  Breast feeding strategies discussed  Pain control measures as needed  Reportable signs and symptoms dicussed with the patient  Anticipate discharge tomorrow    Rachele Mejias MD

## 2020-02-09 PROCEDURE — 25000132 ZZH RX MED GY IP 250 OP 250 PS 637: Performed by: OBSTETRICS & GYNECOLOGY

## 2020-02-09 PROCEDURE — 40000083 ZZH STATISTIC IP LACTATION SERVICES 1-15 MIN

## 2020-02-09 PROCEDURE — 12000000 ZZH R&B MED SURG/OB

## 2020-02-09 RX ORDER — AMOXICILLIN 250 MG
1 CAPSULE ORAL 2 TIMES DAILY PRN
Qty: 30 TABLET | Refills: 0 | Status: SHIPPED | OUTPATIENT
Start: 2020-02-09 | End: 2023-07-17

## 2020-02-09 RX ORDER — IBUPROFEN 800 MG/1
800 TABLET, FILM COATED ORAL EVERY 8 HOURS PRN
Qty: 20 TABLET | Refills: 0 | Status: SHIPPED | OUTPATIENT
Start: 2020-02-09

## 2020-02-09 RX ORDER — ACETAMINOPHEN 325 MG/1
650 TABLET ORAL EVERY 6 HOURS PRN
Qty: 1 BOTTLE | Refills: 0 | Status: SHIPPED | OUTPATIENT
Start: 2020-02-09

## 2020-02-09 RX ORDER — OXYCODONE HYDROCHLORIDE 5 MG/1
5-10 TABLET ORAL
Qty: 15 TABLET | Refills: 0 | Status: ON HOLD | OUTPATIENT
Start: 2020-02-09 | End: 2020-02-19

## 2020-02-09 RX ADMIN — OXYCODONE HYDROCHLORIDE 5 MG: 5 TABLET ORAL at 14:46

## 2020-02-09 RX ADMIN — IBUPROFEN 800 MG: 800 TABLET ORAL at 00:34

## 2020-02-09 RX ADMIN — OXYCODONE HYDROCHLORIDE 5 MG: 5 TABLET ORAL at 19:58

## 2020-02-09 RX ADMIN — ACETAMINOPHEN 975 MG: 325 TABLET, FILM COATED ORAL at 11:36

## 2020-02-09 RX ADMIN — IBUPROFEN 800 MG: 800 TABLET ORAL at 13:05

## 2020-02-09 RX ADMIN — ACETAMINOPHEN 975 MG: 325 TABLET, FILM COATED ORAL at 19:08

## 2020-02-09 RX ADMIN — OXYCODONE HYDROCHLORIDE 5 MG: 5 TABLET ORAL at 01:06

## 2020-02-09 RX ADMIN — IBUPROFEN 800 MG: 800 TABLET ORAL at 06:30

## 2020-02-09 RX ADMIN — ACETAMINOPHEN 975 MG: 325 TABLET, FILM COATED ORAL at 03:15

## 2020-02-09 RX ADMIN — SENNOSIDES AND DOCUSATE SODIUM 1 TABLET: 8.6; 5 TABLET ORAL at 19:07

## 2020-02-09 RX ADMIN — SENNOSIDES AND DOCUSATE SODIUM 2 TABLET: 8.6; 5 TABLET ORAL at 10:29

## 2020-02-09 RX ADMIN — PRENATAL VITAMINS-IRON FUMARATE 27 MG IRON-FOLIC ACID 0.8 MG TABLET 1 TABLET: at 10:28

## 2020-02-09 RX ADMIN — IBUPROFEN 800 MG: 800 TABLET ORAL at 19:07

## 2020-02-09 NOTE — PLAN OF CARE
Vital signs stable on room air.  at bedside this morning and supportive. Bonding well with infant. Breastfeeding well with some assistance from staff with positioning and teaching on how to use the nipple shield. Pain adequately controled with ibuprofen and tylenol, pt aware oxycodone is available if needed. Pt showered. RN applied steri-strips over incision which is well approximated and inter-dry placed in skin fold. Tolerating a regular diet. Ambulating independently. Voiding spontaneously.

## 2020-02-09 NOTE — PROGRESS NOTES
Doing well.    vss afebrile   Abdomen soft and nt  Fundus firm and nt  Incision dry and intact  Extremities nl    Hgb= 9.8    A: POD 2 s/p repeat LTCS and BS     Doing well     Anemia= stable and asymptomatic  P: Discharge home later this afternoon      RTC 6 weeks      Precautions reviewed

## 2020-02-09 NOTE — LACTATION NOTE
Lactation visit.  noted by MD to be tongue tied, not clipped at this time. Nipples painful and damaged, per patient. Patient did not want LC to assess at this time. She appeared to be resting at time of visit. Declined assistance with breast feeding . He was circumcised this morning and was sleeping at time of visit. She states that she attempted to breast feed her first two children, but she was using formula prior to discharge from the hospital. This  has been feeding better than her other two, besides the tongue tie. She is using a nipple shield to help with damage and pain. She denies any questions at this time. Encouraged her to call for assistance with feeding as needed.

## 2020-02-09 NOTE — PLAN OF CARE
Data: Vital signs within normal limits. Postpartum checks within normal limits, see flow record. Patient eating and drinking normally. Patient able to empty bladder independently and is up ambulating. No apparent signs of infection. Incision healing well, new dressing applied due to peeling off.  Patient performing self cares and is able to care for infant.   Action: Patient medicated during the shift for pain and cramping. See MAR. Patient reassessed within 1 hour after each medication and pain was improved, patient stated she was comfortable. Patient education done about self cares, pain management, breastfeeding cues, satiety, and second night behaviors of infant. See flow record.  Response: Positive attachment behaviors observed with infant. Support person Jaime present.   Plan: Anticipate discharge on 02/10/2020.

## 2020-02-10 VITALS
SYSTOLIC BLOOD PRESSURE: 139 MMHG | OXYGEN SATURATION: 100 % | DIASTOLIC BLOOD PRESSURE: 85 MMHG | HEART RATE: 79 BPM | TEMPERATURE: 98.1 F | RESPIRATION RATE: 18 BRPM

## 2020-02-10 LAB — COPATH REPORT: NORMAL

## 2020-02-10 PROCEDURE — 25000132 ZZH RX MED GY IP 250 OP 250 PS 637: Performed by: OBSTETRICS & GYNECOLOGY

## 2020-02-10 RX ADMIN — ACETAMINOPHEN 975 MG: 325 TABLET, FILM COATED ORAL at 04:55

## 2020-02-10 RX ADMIN — IBUPROFEN 800 MG: 800 TABLET ORAL at 07:30

## 2020-02-10 RX ADMIN — OXYCODONE HYDROCHLORIDE 5 MG: 5 TABLET ORAL at 04:58

## 2020-02-10 RX ADMIN — IBUPROFEN 800 MG: 800 TABLET ORAL at 13:01

## 2020-02-10 RX ADMIN — PRENATAL VITAMINS-IRON FUMARATE 27 MG IRON-FOLIC ACID 0.8 MG TABLET 1 TABLET: at 13:00

## 2020-02-10 RX ADMIN — OXYCODONE HYDROCHLORIDE 5 MG: 5 TABLET ORAL at 13:27

## 2020-02-10 RX ADMIN — IBUPROFEN 800 MG: 800 TABLET ORAL at 01:00

## 2020-02-10 NOTE — DISCHARGE INSTRUCTIONS
Postop  Birth Instructions    Lactation: [phone]  Home Care: [phone]    Activity       Do not lift more than 10 pounds for 6 weeks after surgery.  Ask family and friends for help when you need it.    No driving until you have stopped taking your pain medications (usually two weeks after surgery).    No heavy exercise or activity for 6 weeks.  Don't do anything that will put a strain on your surgery site.    Don't strain when using the toilet.  Your care team may prescribe a stool softener if you have problems with your bowel movements.     To care for your incision:       Keep the incision clean and dry.    Do not soak your incision in water. No swimming or hot tubs until it has fully healed. You may soak in the bathtub if the water level is below your incision.    Do not use peroxide, gel, cream, lotion, or ointment on your incision.    Adjust your clothes to avoid pressure on your surgery site (check the elastic in your underwear for example).     You may see a small amount of clear or pink drainage and this is normal.  Check with your health care provider:       If the drainage increases or has an odor.    If the incision reddens, you have swelling, or develop a rash.    If you have increased pain and the medicine we prescribed doesn't help.    If you have a fever above 100.4 F (38 C) with or without chills when placing thermometer under your tongue.   The area around your incision (surgery wound), will feel numb.  This is normal. The numbness should go away in less than a year.     Keep your hands clean:  Always wash your hands before touching your incision (surgery wound). This helps reduce your risk of infection. If your hands aren't dirty, you may use an alcohol hand-rub to clean your hands. Keep your nails clean and short.    Call your healthcare provider if you have any of these symptoms:       You soak a sanitary pad with blood within 1 hour, or you see blood clots larger than a golf  ball.    Bleeding that lasts more than 6 weeks.    Vaginal discharge that smells bad.    Severe pain, cramping or tenderness in your lower belly area.    A need to urinate more frequently (use the toilet more often), more urgently (use the toilet very quickly), or it burns when you urinate.    Nausea and vomiting.    Redness, swelling or pain around a vein in your leg.    Problems breastfeeding or a red or painful area on your breast.    Chest pain and cough or are gasping for air.    Problems with coping with sadness, anxiety or depression. If you have concerns about hurting yourself or the baby, call your provider immediately.      You have questions or concerns after you return home.

## 2020-02-10 NOTE — PROGRESS NOTES
POD3  Doing well, breastfeeding and supplementing  /65   Pulse 84   Temp 97.6  F (36.4  C) (Oral)   Resp 18   SpO2 100%   Breastfeeding Unknown    NAD  Abd Soft, ND  Inc: CDI  Ext Tr edema    POD3 s/p RLTCS BTL  Routine care  Discharge today  F/u 6 wks  Terri Omer MD

## 2020-02-10 NOTE — PLAN OF CARE
Pt did decide to stay until tomorrow vs discharge today, MD aware. Vitals stable. Pain well controlled with Ibuprofen, Tylenol, occasional Oxycodone. Incision well approximated, steri-strips and interdry in place.  and mother at bedside and attentive to pt. Anticipating discharge to home tomorrow.

## 2020-02-10 NOTE — PLAN OF CARE
Data: Vital signs within normal limits. Postpartum checks within normal limits - see flow record. Patient eating and drinking normally. Patient able to empty bladder independently and is up ambulating. No apparent signs of infection. Incision healing well. Patient performing self cares and is able to care for infant.  Action: Patient medicated during the shift for pain and cramping. See MAR. Patient reassessed within 1 hour after each medication and pain was improved - patient stated she was comfortable. Patient education done about discharge. See flow record.  Response: Positive attachment behaviors observed with infant. Support persons are present, pt's mother is available.     Discharge instructions were discussed and all questions and concerns addressed. Pt discharged with  and infant in private transportation to home at 1400.

## 2020-02-10 NOTE — PLAN OF CARE
Pain managed with ibuprofen, tylenol, and oxycodone. VSS, up adlib, and voiding without difficulty. Steristrips intact with no drainage at incision. Breastfeeding and using shield without assistance. Bonding well with infant and independent with cares. Anticipated discharge today.

## 2020-02-10 NOTE — LACTATION NOTE
This note was copied from a baby's chart.  LC visit. Her baby has been using a nipple shield to latch and is the first infant that she has been successful with latching.  No latch observed as he was sleeping and had recently fed. She is offering formula after nursing to give him additional calories due to weight loss. She has not been pumping, so  recommended pumping post feeds until her full milk comes in and she is able to pump at least 10 mL after nursing.   reviewed how to wean off the nipple shield, offered OP visits if she needs assistance following discharge, and encouraged her to call prn.

## 2020-02-10 NOTE — PROGRESS NOTES
Public Health Nurse (PHN) met with patient, discussed Public Health Nursing Intake phone number for [facility]. Patient reports no questions or concerns at this time.

## 2020-02-14 NOTE — DISCHARGE SUMMARY
Bree is a 33 yo  who underwent a repeat LTCS and BS on 20. There were no complications during  the surgery. Bree did well post op.     She was discharged on POD 3.    She was given pain medications, precautions reviewed and will RTC 6 weeks.

## 2020-02-17 ENCOUNTER — APPOINTMENT (OUTPATIENT)
Dept: ULTRASOUND IMAGING | Facility: CLINIC | Age: 35
End: 2020-02-17
Attending: EMERGENCY MEDICINE
Payer: MEDICAID

## 2020-02-17 ENCOUNTER — HOSPITAL ENCOUNTER (OUTPATIENT)
Facility: CLINIC | Age: 35
Setting detail: OBSERVATION
Discharge: HOME | End: 2020-02-19
Attending: EMERGENCY MEDICINE | Admitting: OBSTETRICS & GYNECOLOGY
Payer: MEDICAID

## 2020-02-17 DIAGNOSIS — N71.9 ENDOMETRITIS: Primary | ICD-10-CM

## 2020-02-17 LAB
ANION GAP SERPL CALCULATED.3IONS-SCNC: 4 MMOL/L (ref 3–14)
BASOPHILS # BLD AUTO: 0 10E9/L (ref 0–0.2)
BASOPHILS NFR BLD AUTO: 0.3 %
BUN SERPL-MCNC: 13 MG/DL (ref 7–30)
CALCIUM SERPL-MCNC: 8.1 MG/DL (ref 8.5–10.1)
CHLORIDE SERPL-SCNC: 106 MMOL/L (ref 94–109)
CO2 SERPL-SCNC: 26 MMOL/L (ref 20–32)
CREAT SERPL-MCNC: 0.7 MG/DL (ref 0.52–1.04)
DIFFERENTIAL METHOD BLD: ABNORMAL
EOSINOPHIL # BLD AUTO: 0 10E9/L (ref 0–0.7)
EOSINOPHIL NFR BLD AUTO: 0.2 %
ERYTHROCYTE [DISTWIDTH] IN BLOOD BY AUTOMATED COUNT: 13.9 % (ref 10–15)
GFR SERPL CREATININE-BSD FRML MDRD: >90 ML/MIN/{1.73_M2}
GLUCOSE SERPL-MCNC: 113 MG/DL (ref 70–99)
HCT VFR BLD AUTO: 26 % (ref 35–47)
HGB BLD-MCNC: 7.1 G/DL (ref 11.7–15.7)
HGB BLD-MCNC: 7.5 G/DL (ref 11.7–15.7)
HGB BLD-MCNC: 8.3 G/DL (ref 11.7–15.7)
IMM GRANULOCYTES # BLD: 0 10E9/L (ref 0–0.4)
IMM GRANULOCYTES NFR BLD: 0.3 %
LACTATE BLD-SCNC: 0.7 MMOL/L (ref 0.7–2)
LYMPHOCYTES # BLD AUTO: 0.9 10E9/L (ref 0.8–5.3)
LYMPHOCYTES NFR BLD AUTO: 7.2 %
MCH RBC QN AUTO: 29.1 PG (ref 26.5–33)
MCHC RBC AUTO-ENTMCNC: 31.9 G/DL (ref 31.5–36.5)
MCV RBC AUTO: 91 FL (ref 78–100)
MONOCYTES # BLD AUTO: 0.6 10E9/L (ref 0–1.3)
MONOCYTES NFR BLD AUTO: 4.9 %
NEUTROPHILS # BLD AUTO: 10.5 10E9/L (ref 1.6–8.3)
NEUTROPHILS NFR BLD AUTO: 87.1 %
NRBC # BLD AUTO: 0 10*3/UL
NRBC BLD AUTO-RTO: 0 /100
PLATELET # BLD AUTO: 284 10E9/L (ref 150–450)
POTASSIUM SERPL-SCNC: 3.7 MMOL/L (ref 3.4–5.3)
RBC # BLD AUTO: 2.85 10E12/L (ref 3.8–5.2)
SODIUM SERPL-SCNC: 136 MMOL/L (ref 133–144)
WBC # BLD AUTO: 12 10E9/L (ref 4–11)

## 2020-02-17 PROCEDURE — 99285 EMERGENCY DEPT VISIT HI MDM: CPT | Mod: 25

## 2020-02-17 PROCEDURE — 25000132 ZZH RX MED GY IP 250 OP 250 PS 637: Performed by: OBSTETRICS & GYNECOLOGY

## 2020-02-17 PROCEDURE — 36415 COLL VENOUS BLD VENIPUNCTURE: CPT | Performed by: OBSTETRICS & GYNECOLOGY

## 2020-02-17 PROCEDURE — 25000128 H RX IP 250 OP 636: Performed by: OBSTETRICS & GYNECOLOGY

## 2020-02-17 PROCEDURE — 25800030 ZZH RX IP 258 OP 636: Performed by: OBSTETRICS & GYNECOLOGY

## 2020-02-17 PROCEDURE — 96361 HYDRATE IV INFUSION ADD-ON: CPT

## 2020-02-17 PROCEDURE — 80048 BASIC METABOLIC PNL TOTAL CA: CPT | Performed by: EMERGENCY MEDICINE

## 2020-02-17 PROCEDURE — 86900 BLOOD TYPING SEROLOGIC ABO: CPT | Performed by: EMERGENCY MEDICINE

## 2020-02-17 PROCEDURE — 85018 HEMOGLOBIN: CPT | Mod: 91 | Performed by: OBSTETRICS & GYNECOLOGY

## 2020-02-17 PROCEDURE — G0378 HOSPITAL OBSERVATION PER HR: HCPCS

## 2020-02-17 PROCEDURE — 86923 COMPATIBILITY TEST ELECTRIC: CPT | Performed by: EMERGENCY MEDICINE

## 2020-02-17 PROCEDURE — 83605 ASSAY OF LACTIC ACID: CPT | Performed by: OBSTETRICS & GYNECOLOGY

## 2020-02-17 PROCEDURE — 86901 BLOOD TYPING SEROLOGIC RH(D): CPT | Performed by: EMERGENCY MEDICINE

## 2020-02-17 PROCEDURE — 25000125 ZZHC RX 250: Performed by: OBSTETRICS & GYNECOLOGY

## 2020-02-17 PROCEDURE — 85025 COMPLETE CBC W/AUTO DIFF WBC: CPT | Performed by: EMERGENCY MEDICINE

## 2020-02-17 PROCEDURE — 76830 TRANSVAGINAL US NON-OB: CPT

## 2020-02-17 PROCEDURE — 96375 TX/PRO/DX INJ NEW DRUG ADDON: CPT

## 2020-02-17 PROCEDURE — 96374 THER/PROPH/DIAG INJ IV PUSH: CPT

## 2020-02-17 PROCEDURE — 25000128 H RX IP 250 OP 636: Performed by: EMERGENCY MEDICINE

## 2020-02-17 PROCEDURE — 36415 COLL VENOUS BLD VENIPUNCTURE: CPT | Performed by: EMERGENCY MEDICINE

## 2020-02-17 PROCEDURE — 87040 BLOOD CULTURE FOR BACTERIA: CPT | Performed by: EMERGENCY MEDICINE

## 2020-02-17 PROCEDURE — 86850 RBC ANTIBODY SCREEN: CPT | Performed by: EMERGENCY MEDICINE

## 2020-02-17 PROCEDURE — 25800030 ZZH RX IP 258 OP 636: Performed by: EMERGENCY MEDICINE

## 2020-02-17 RX ORDER — HYDROMORPHONE HYDROCHLORIDE 1 MG/ML
0.5 INJECTION, SOLUTION INTRAMUSCULAR; INTRAVENOUS; SUBCUTANEOUS
Status: DISCONTINUED | OUTPATIENT
Start: 2020-02-17 | End: 2020-02-19 | Stop reason: HOSPADM

## 2020-02-17 RX ORDER — DEXTROSE MONOHYDRATE, SODIUM CHLORIDE, SODIUM LACTATE, POTASSIUM CHLORIDE, CALCIUM CHLORIDE 5; 600; 310; 179; 20 G/100ML; MG/100ML; MG/100ML; MG/100ML; MG/100ML
INJECTION, SOLUTION INTRAVENOUS CONTINUOUS
Status: DISCONTINUED | OUTPATIENT
Start: 2020-02-17 | End: 2020-02-17

## 2020-02-17 RX ORDER — IBUPROFEN 600 MG/1
600 TABLET, FILM COATED ORAL EVERY 6 HOURS PRN
Status: DISCONTINUED | OUTPATIENT
Start: 2020-02-17 | End: 2020-02-19 | Stop reason: HOSPADM

## 2020-02-17 RX ORDER — NALOXONE HYDROCHLORIDE 0.4 MG/ML
.1-.4 INJECTION, SOLUTION INTRAMUSCULAR; INTRAVENOUS; SUBCUTANEOUS
Status: DISCONTINUED | OUTPATIENT
Start: 2020-02-17 | End: 2020-02-19 | Stop reason: HOSPADM

## 2020-02-17 RX ORDER — ACETAMINOPHEN 325 MG/1
650 TABLET ORAL EVERY 4 HOURS PRN
Status: DISCONTINUED | OUTPATIENT
Start: 2020-02-17 | End: 2020-02-19 | Stop reason: HOSPADM

## 2020-02-17 RX ORDER — OXYCODONE HYDROCHLORIDE 5 MG/1
5-10 TABLET ORAL EVERY 4 HOURS PRN
Status: DISCONTINUED | OUTPATIENT
Start: 2020-02-17 | End: 2020-02-19 | Stop reason: HOSPADM

## 2020-02-17 RX ORDER — CLINDAMYCIN PHOSPHATE 900 MG/50ML
900 INJECTION, SOLUTION INTRAVENOUS EVERY 8 HOURS
Status: DISCONTINUED | OUTPATIENT
Start: 2020-02-17 | End: 2020-02-19 | Stop reason: HOSPADM

## 2020-02-17 RX ORDER — SODIUM CHLORIDE 9 MG/ML
1000 INJECTION, SOLUTION INTRAVENOUS CONTINUOUS
Status: DISCONTINUED | OUTPATIENT
Start: 2020-02-17 | End: 2020-02-19 | Stop reason: HOSPADM

## 2020-02-17 RX ADMIN — POTASSIUM CHLORIDE: 2 INJECTION, SOLUTION, CONCENTRATE INTRAVENOUS at 19:58

## 2020-02-17 RX ADMIN — ACETAMINOPHEN 650 MG: 325 TABLET, FILM COATED ORAL at 19:53

## 2020-02-17 RX ADMIN — SODIUM CHLORIDE 1000 ML: 9 INJECTION, SOLUTION INTRAVENOUS at 16:52

## 2020-02-17 RX ADMIN — GENTAMICIN SULFATE 170 MG: 40 INJECTION, SOLUTION INTRAMUSCULAR; INTRAVENOUS at 21:12

## 2020-02-17 RX ADMIN — OXYCODONE HYDROCHLORIDE 5 MG: 5 TABLET ORAL at 21:39

## 2020-02-17 RX ADMIN — CLINDAMYCIN PHOSPHATE 900 MG: 900 INJECTION, SOLUTION INTRAVENOUS at 19:59

## 2020-02-17 RX ADMIN — HYDROMORPHONE HYDROCHLORIDE 0.5 MG: 1 INJECTION, SOLUTION INTRAMUSCULAR; INTRAVENOUS; SUBCUTANEOUS at 16:27

## 2020-02-17 RX ADMIN — IBUPROFEN 600 MG: 600 TABLET, FILM COATED ORAL at 19:53

## 2020-02-17 ASSESSMENT — ACTIVITIES OF DAILY LIVING (ADL)
SWALLOWING: 0-->SWALLOWS FOODS/LIQUIDS WITHOUT DIFFICULTY
FALL_HISTORY_WITHIN_LAST_SIX_MONTHS: NO
RETIRED_EATING: 0-->INDEPENDENT
TOILETING: 0-->INDEPENDENT
DRESS: 0-->INDEPENDENT
RETIRED_COMMUNICATION: 0-->UNDERSTANDS/COMMUNICATES WITHOUT DIFFICULTY
COGNITION: 0 - NO COGNITION ISSUES REPORTED
TRANSFERRING: 0-->INDEPENDENT
AMBULATION: 0-->INDEPENDENT
BATHING: 0-->INDEPENDENT

## 2020-02-17 ASSESSMENT — ENCOUNTER SYMPTOMS
ABDOMINAL PAIN: 1
LIGHT-HEADEDNESS: 1
DIAPHORESIS: 1
FEVER: 0

## 2020-02-17 NOTE — PHARMACY-ADMISSION MEDICATION HISTORY
Admission medication history interview status for this patient is complete. See [facility] admission navigator for allergy information, prior to admission medications and immunization status.     Medication history interview source(s):Patient  Medication history resources (including written lists, pill bottles, clinic record):None    Changes made to PTA medication list:  Added: none  Deleted: none  Changed: none    Actions taken by pharmacist (provider contacted, etc):None     Additional medication history information:None    Medication reconciliation/reorder completed by provider prior to medication history? No    For patients on insulin therapy: no (Yes/No)   Lantus/levemir/NPH/Mix 70/30 dose: ___ in AM/PM or twice daily   Sliding scale Novolog Y/N   If Yes, do you have a baseline novolog pre-meal dose: ______units with meals   Patients eat three meals a day: Y/N ---  How many episodes of hypoglycemia (low blood glucose) do you have weekly: ---   How many missed doses do you have a week: ---  How many times do you check your blood glucose per day: ---  Any Barriers to therapy: cost of medications/comfortable with giving injections (if applicable)/ comfortable and confident with current diabetes regimen ---      Prior to Admission medications    Medication Sig Last Dose Taking? Auth Provider   acetaminophen (TYLENOL) 325 MG tablet Take 2 tablets (650 mg) by mouth every 6 hours as needed for other (multimodal surgical pain management along with NSAIDS and opioid medication as indicated based on pain control and physical function.) 2/17/2020 at am Yes Shanique Lyoa MD   ibuprofen (ADVIL/MOTRIN) 800 MG tablet Take 1 tablet (800 mg) by mouth every 8 hours as needed for other (cramping) 2/17/2020 at am Yes Shanique Loya MD   oxyCODONE (ROXICODONE) 5 MG tablet Take 1-2 tablets (5-10 mg) by mouth every 3 hours as needed for pain 2/17/2020 at am Yes Shanique Loya MD   Prenatal Vit-Fe Fumarate-FA (PRENATAL MULTIVITAMIN  PLUS  IRON) 27-0.8 MG TABS Take 1 tablet by mouth daily Past Week at Unknown time Yes Reported, Patient   senna-docusate (SENOKOT-S/PERICOLACE) 8.6-50 MG tablet Take 1 tablet by mouth 2 times daily as needed for constipation  Yes Shanique Loya MD

## 2020-02-17 NOTE — ED PROVIDER NOTES
"  History     Chief Complaint:  Vaginal bleeding    HPI   Bree Alexis is a 34 year old female,  s/p  section with bilateral salpingectomy 10 days, who presents with her  to the emergency department for evaluation of vaginal bleeding. The patient reports she recently had a  section with bilateral salpingectomy ten days ago, with no complications and had stopped experiencing pain or vaginal bleeding. She states she woke up this morning after feeling a \"gush\" of blood and went to the bathroom where she saw large amounts of vaginal bleeding. She states she has been bleeding since that time and is changing her menstrual pad every hour. She does note occasional clots and states the bleeding is more severe than a normal menses. She denies any foul smelling discharge or fever. She reports she does experience lightheadedness and diaphoresis with long periods of standing and even occasionally when lying down. She also notes diffuse abdominal pain, worse than she has experienced thus far. She denies any use of blood thinners or any other health history.    Allergies:  No known drug allergies     Medications:    Oxycodone  Prenatal multivitamin  Senna-docusate    Past Medical History:    Vitamin D deficiency  Anxiety  Depression    Past Surgical History:     section x2  Combine  section and bilateral salpingectomy  Tonsillectomy and adenoidectomy    Family History:    Cardiovascular    Social History:  Smoking status: Former smoker of 0.50 ppd for 6 years. Quit date: 2015  Alcohol use: No  Drug use: No  The patient presents to the emergency department with her .  PCP: Candy Driver  Marital Status:   [2]     Review of Systems   Constitutional: Positive for diaphoresis. Negative for fever.   Gastrointestinal: Positive for abdominal pain.   Genitourinary: Positive for vaginal bleeding. Negative for vaginal discharge (foul smelling).   Neurological: Positive " for light-headedness.   All other systems reviewed and are negative.    Physical Exam     Patient Vitals for the past 24 hrs:   BP Temp Temp src Pulse Heart Rate Resp SpO2 Weight   02/17/20 1745 137/61 -- -- 92 -- -- 98 % --   02/17/20 1730 (!) 150/87 -- -- 83 -- -- 97 % --   02/17/20 1715 (!) 143/83 -- -- 88 -- -- 95 % --   02/17/20 1700 (!) 142/85 -- -- 89 -- -- 97 % --   02/17/20 1659 -- -- -- -- -- -- 97 % --   02/17/20 1658 -- -- -- -- -- -- 98 % --   02/17/20 1515 136/82 99.9  F (37.7  C) Temporal -- 111 16 98 % 113.7 kg (250 lb 10.6 oz)     Physical Exam  General: Patient is alert and interactive when I enter the room, pale appearing   Head:  The scalp, face, and head appear normal  Eyes:  Conjunctivae are normal  ENT:    The nose is normal    Pinnae are normal    External acoustic canals are normal  Neck:  Trachea midline  CV:  Pulses are normal   Resp:  No respiratory distress   Abdomen:      Soft, anterior uterine tenderness, non-distended  :  Moderate amount of blood in vaginal vault, significant uterine tenderness on bimanual examination  Musc:  Normal muscular tone    No major joint effusions    No asymmetric leg swelling  Skin:  No rash or lesions noted  Neuro:  Speech is normal and fluent. Face is symmetric.     Moving all extremities well.   Psych: Awake. Alert.  Normal affect.  Appropriate interactions.      Emergency Department Course   Imaging:  Radiographic findings were communicated with the patient and family who voiced understanding of the findings.    US Pelvic Complete with Transvaginal  IMPRESSION:  Very thickened heterogenous endometrial stripe measures 5  cm, worrisome for retained product of conception.  As read by Radiology.    Laboratory:  CBC: WBC 12.0 (H), HGB 8.3 (L) o/w WNL ()  BMP: Glucose 113 (H), Calcium 8.1 (L) o/w WNL (Creatinine 0.70)  ABO/Rh type and screen: A+ (Antibody Neg)    Interventions:  1627 Dilaudid 0.5 mg IV  1652 NS 1L IV Bolus    Emergency Department  Course:  Past medical records, nursing notes, and vitals reviewed.  1529: I performed an exam of the patient and obtained history, as documented above.     IV inserted and blood drawn.    The patient was sent for a pelvic ultrasound while in the emergency department, findings above.    1732: I discussed the patient with Dr. Trujillo, [facility].    1747: Findings and plan explained to the patient who consents to observation.     Discussed the patient with Dr. Trujillo, who will place the patient in observation in the observation area.     Impression & Plan    Medical Decision Making:  Bree Alexis is a 35 yo F who presents with postpartum bleeding.  Patient had a  10 days ago.  She was actually doing quite well with minimal tenderness and bleeding.  However both of these things increase today.  She arrives and appears quite pale although blood pressure was normal.  She had a low-grade temp at 99.9 and heart rate 111.  CBC revealed a hemoglobin of 8.3 which is lower than her discharge at 9.8.  Type and screen was done.  Blood cultures obtained.  Lactate was normal.  Ultrasound was done which revealed a thickened endometrium which either could be retained products of conception or postpartum endometritis.  Discussed the case with OB on-call.  Recommended hobs admission given her significant pain.  She did require IV narcotics to keep her pain under control.  Plan for IV antibiotics and observation.  Will keep n.p.o.  Patient admitted to OB.    Diagnosis:    ICD-10-CM    1. Postpartum hemorrhage, unspecified type O72.1 Hemoglobin       Disposition:  Admitted to observation.    Iris Saravia  2020   [facility] EMERGENCY DEPARTMENT  Scribe Disclosure:  I, Iris Saravia, am serving as a scribe at 3:29 PM on 2020 to document services personally performed by Helen Archer MD based on my observations and the provider's statements to me.      Helen Archer MD  [phone]

## 2020-02-17 NOTE — ED NOTES
[facility]  ED Nurse Handoff Report    Bree Alexis is a 34 year old female   ED Chief complaint: Postpartum Complications  . ED Diagnosis:   Final diagnoses:   None     Allergies: No Known Allergies    Code Status: Full Code  Activity level - Baseline/Home:  Independent. Activity Level - Current:   Independent. Lift room needed: No. Bariatric: No   Needed: No   Isolation: No. Infection: Not Applicable.     Vital Signs:   Vitals:    02/17/20 1659 02/17/20 1700 02/17/20 1715 02/17/20 1730   BP:  (!) 142/85 (!) 143/83 (!) 150/87   Pulse:  89 88 83   Resp:       Temp:       TempSrc:       SpO2: 97% 97% 95% 97%   Weight:           Cardiac Rhythm:  ,      Pain level: 0-10 Pain Scale: 5  Patient confused: No. Patient Falls Risk: Yes.   Elimination Status: Has voided   Patient Report - Initial Complaint: Pt presents to ED for evaluation of heavy vaginal bleeding; pt going through 1 pad per hour since 0500 today.  No clots noted.  Pt is 10 days post csection, no complications, pt is not breastfeeding.  No fevers.  Tachycardic and low grade fever in triage.   Focused Assessment: Vaginal Bleeding - Vaginal Bleeding: present  Characteristics (Vaginal Bleeding): red, bright (10 days post c section, has increased bleeding and pain today . states has soaked a pad an hour since 0500)  Additional Rows - Characteristics (Vaginal Bleeding): red, bright (10 days post c section, has increased bleeding and pain today . states has soaked a pad an hour since 0500)   Tests Performed: Labs, U/S. Abnormal Results:   Labs Ordered and Resulted from Time of ED Arrival Up to the Time of Departure from the ED   CBC WITH PLATELETS DIFFERENTIAL - Abnormal; Notable for the following components:       Result Value    WBC 12.0 (*)     RBC Count 2.85 (*)     Hemoglobin 8.3 (*)     Hematocrit 26.0 (*)     Absolute Neutrophil 10.5 (*)     All other components within normal limits   BASIC METABOLIC PANEL - Abnormal; Notable  for the following components:    Glucose 113 (*)     Calcium 8.1 (*)     All other components within normal limits   LACTIC ACID WHOLE BLOOD   NURSING DRAW AND HOLD   ABO/RH TYPE AND SCREEN   BLOOD CULTURE   BLOOD CULTURE     US Pelvic Complete with Transvaginal   Final Result   IMPRESSION:  Very thickened heterogenous endometrial stripe measures 5   cm, worrisome for retained product of conception.      DEMETRICE CHAPARRO MD        .   Treatments provided: IV fluids, Dilaudid, comfort positioning.  Family Comments: Family at bedside.  OBS brochure/video discussed/provided to patient:  Yes  ED Medications:   Medications   HYDROmorphone (PF) (DILAUDID) injection 0.5 mg (0.5 mg Intravenous Given 2/17/20 0367)   0.9% sodium chloride BOLUS (1,000 mLs Intravenous New Bag 2/17/20 0171)     Followed by   sodium chloride 0.9% infusion (has no administration in time range)     Drips infusing:  No  For the majority of the shift, the patient's behavior Green. Interventions performed were N/A.    Sepsis treatment initiated: No       ED Nurse Name/Phone Number: Yarelis Gillette RN,   5:38 PM

## 2020-02-17 NOTE — ED TRIAGE NOTES
Pt presents to ED for evaluation of heavy vaginal bleeding; pt going through 1 pad per hour since 0500 today.  No clots noted.  Pt is 10 days post csection, no complications, pt is not breastfeeding.  No fevers.  Tachycardic and low grade fever in triage.

## 2020-02-18 LAB
ABO + RH BLD: NORMAL
ABO + RH BLD: NORMAL
BASOPHILS # BLD AUTO: 0 10E9/L (ref 0–0.2)
BASOPHILS NFR BLD AUTO: 0.5 %
BLD GP AB SCN SERPL QL: NORMAL
BLD PROD TYP BPU: NORMAL
BLD UNIT ID BPU: 0
BLOOD BANK CMNT PATIENT-IMP: NORMAL
BLOOD PRODUCT CODE: NORMAL
BPU ID: NORMAL
DIFFERENTIAL METHOD BLD: ABNORMAL
EOSINOPHIL # BLD AUTO: 0.1 10E9/L (ref 0–0.7)
EOSINOPHIL NFR BLD AUTO: 2 %
ERYTHROCYTE [DISTWIDTH] IN BLOOD BY AUTOMATED COUNT: 14.4 % (ref 10–15)
HCT VFR BLD AUTO: 22.1 % (ref 35–47)
HGB BLD-MCNC: 6.8 G/DL (ref 11.7–15.7)
HGB BLD-MCNC: 7 G/DL (ref 11.7–15.7)
HGB BLD-MCNC: 7.9 G/DL (ref 11.7–15.7)
IMM GRANULOCYTES # BLD: 0 10E9/L (ref 0–0.4)
IMM GRANULOCYTES NFR BLD: 0.3 %
LYMPHOCYTES # BLD AUTO: 2 10E9/L (ref 0.8–5.3)
LYMPHOCYTES NFR BLD AUTO: 30.3 %
MCH RBC QN AUTO: 29.7 PG (ref 26.5–33)
MCHC RBC AUTO-ENTMCNC: 31.7 G/DL (ref 31.5–36.5)
MCV RBC AUTO: 94 FL (ref 78–100)
MONOCYTES # BLD AUTO: 0.7 10E9/L (ref 0–1.3)
MONOCYTES NFR BLD AUTO: 11.5 %
NEUTROPHILS # BLD AUTO: 3.6 10E9/L (ref 1.6–8.3)
NEUTROPHILS NFR BLD AUTO: 55.4 %
NRBC # BLD AUTO: 0 10*3/UL
NRBC BLD AUTO-RTO: 0 /100
NUM BPU REQUESTED: 1
PLATELET # BLD AUTO: 226 10E9/L (ref 150–450)
RBC # BLD AUTO: 2.36 10E12/L (ref 3.8–5.2)
SPECIMEN EXP DATE BLD: NORMAL
TRANSFUSION STATUS PATIENT QL: NORMAL
TRANSFUSION STATUS PATIENT QL: NORMAL
WBC # BLD AUTO: 6.4 10E9/L (ref 4–11)

## 2020-02-18 PROCEDURE — 25800030 ZZH RX IP 258 OP 636: Performed by: OBSTETRICS & GYNECOLOGY

## 2020-02-18 PROCEDURE — G0378 HOSPITAL OBSERVATION PER HR: HCPCS

## 2020-02-18 PROCEDURE — 96376 TX/PRO/DX INJ SAME DRUG ADON: CPT

## 2020-02-18 PROCEDURE — 25000132 ZZH RX MED GY IP 250 OP 250 PS 637: Performed by: OBSTETRICS & GYNECOLOGY

## 2020-02-18 PROCEDURE — 85018 HEMOGLOBIN: CPT | Mod: 91 | Performed by: OBSTETRICS & GYNECOLOGY

## 2020-02-18 PROCEDURE — 96361 HYDRATE IV INFUSION ADD-ON: CPT

## 2020-02-18 PROCEDURE — 25000128 H RX IP 250 OP 636: Performed by: OBSTETRICS & GYNECOLOGY

## 2020-02-18 PROCEDURE — 36415 COLL VENOUS BLD VENIPUNCTURE: CPT | Performed by: OBSTETRICS & GYNECOLOGY

## 2020-02-18 PROCEDURE — 25000125 ZZHC RX 250: Performed by: OBSTETRICS & GYNECOLOGY

## 2020-02-18 PROCEDURE — P9016 RBC LEUKOCYTES REDUCED: HCPCS | Performed by: EMERGENCY MEDICINE

## 2020-02-18 PROCEDURE — 85025 COMPLETE CBC W/AUTO DIFF WBC: CPT | Performed by: OBSTETRICS & GYNECOLOGY

## 2020-02-18 PROCEDURE — 36430 TRANSFUSION BLD/BLD COMPNT: CPT

## 2020-02-18 RX ADMIN — IBUPROFEN 600 MG: 600 TABLET, FILM COATED ORAL at 13:50

## 2020-02-18 RX ADMIN — ACETAMINOPHEN 650 MG: 325 TABLET, FILM COATED ORAL at 12:08

## 2020-02-18 RX ADMIN — CLINDAMYCIN PHOSPHATE 900 MG: 900 INJECTION, SOLUTION INTRAVENOUS at 20:07

## 2020-02-18 RX ADMIN — ACETAMINOPHEN 650 MG: 325 TABLET, FILM COATED ORAL at 08:13

## 2020-02-18 RX ADMIN — OXYCODONE HYDROCHLORIDE 5 MG: 5 TABLET ORAL at 23:07

## 2020-02-18 RX ADMIN — OXYCODONE HYDROCHLORIDE 5 MG: 5 TABLET ORAL at 15:11

## 2020-02-18 RX ADMIN — OXYCODONE HYDROCHLORIDE 5 MG: 5 TABLET ORAL at 02:12

## 2020-02-18 RX ADMIN — IBUPROFEN 600 MG: 600 TABLET, FILM COATED ORAL at 08:13

## 2020-02-18 RX ADMIN — GENTAMICIN SULFATE 170 MG: 40 INJECTION, SOLUTION INTRAMUSCULAR; INTRAVENOUS at 05:31

## 2020-02-18 RX ADMIN — ACETAMINOPHEN 650 MG: 325 TABLET, FILM COATED ORAL at 04:04

## 2020-02-18 RX ADMIN — ACETAMINOPHEN 650 MG: 325 TABLET, FILM COATED ORAL at 00:03

## 2020-02-18 RX ADMIN — CLINDAMYCIN PHOSPHATE 900 MG: 900 INJECTION, SOLUTION INTRAVENOUS at 12:45

## 2020-02-18 RX ADMIN — OXYCODONE HYDROCHLORIDE 5 MG: 5 TABLET ORAL at 19:04

## 2020-02-18 RX ADMIN — GENTAMICIN SULFATE 170 MG: 40 INJECTION, SOLUTION INTRAMUSCULAR; INTRAVENOUS at 21:33

## 2020-02-18 RX ADMIN — IBUPROFEN 600 MG: 600 TABLET, FILM COATED ORAL at 20:07

## 2020-02-18 RX ADMIN — ACETAMINOPHEN 650 MG: 325 TABLET, FILM COATED ORAL at 22:11

## 2020-02-18 RX ADMIN — CLINDAMYCIN PHOSPHATE 900 MG: 900 INJECTION, SOLUTION INTRAVENOUS at 04:05

## 2020-02-18 RX ADMIN — OXYCODONE HYDROCHLORIDE 5 MG: 5 TABLET ORAL at 06:01

## 2020-02-18 RX ADMIN — IBUPROFEN 600 MG: 600 TABLET, FILM COATED ORAL at 02:12

## 2020-02-18 RX ADMIN — OXYCODONE HYDROCHLORIDE 5 MG: 5 TABLET ORAL at 10:56

## 2020-02-18 RX ADMIN — ACETAMINOPHEN 650 MG: 325 TABLET, FILM COATED ORAL at 16:49

## 2020-02-18 NOTE — PLAN OF CARE
Cares assumed of this patient. Vital signs stable. Pain well controlled. Blood administration completed, continue IV antibiotics. Hgb at 1800. Patient may eat.

## 2020-02-18 NOTE — PROVIDER NOTIFICATION
02/18/20 0227   Provider Notification   Provider Name/Title Dr Trujillo   Method of Notification Phone   Request Evaluate-Remote   Notification Reason Lab Results   Comments   Comments Hgb 6.8     Informed Dr. Trujillo critical lab result Hgb 6.8 and that patient is asymptomatic with light vaginal bleeding. Recheck ordered for 0600. Will continue to monitor Q4 VS and vaginal bleeding. MD plans to discuss options for transfusion with patient during morning rounds.

## 2020-02-18 NOTE — H&P
"2020        HPI: 33yo  WF s/p repeat LTCS + BL salpingectomy on 20, here for heavy VB, worsening pelvic pain.  She had been feeling well since her delivery and VB/lochia and post-op pain was essentially resolved.  However, she woke up this AM with sudden heavy VB, and was going through a regular maxi pad every hour.  She was also having constant abdominopelvic pain when the bleeding started.  She does report intermittent LH.  She has also noted a HA for 3 days, has taken tylenol only maybe once a day.  She denies CP/palp/SOB.  In the ER, she was initially tachycardic 111 bpm, with BPs 130s-140s/70s, temp 99.9 F.  She states the VB did seem to slow down over car ride to ER, although had a small \"gush\" in the ER.  Her Hgb post-op was 9.8 on  - in the ER, Hgb was 8.3.  She was noted to have moderate ttp lower adomen.  Pelvic US revealed significantly thickened heterogeneous endometrium up to 50 mm however no vascularity noted upon review of images.  Her prenatal care had been uncomplicated other than +GBS, obesity and prior 2 C/S.  Operative report from  with uncomplicated repeat C/S and salpingectomy as well.    PMH:   H/o anxiety, depression  Obesity    PSH:   C/S x 3  Bilateral salpingectomy  [place] teeth extraction  Tonsillectomy, adenoidectomy    MEDs:   Ibuprofen, tylenol, oxycodone PRN  Senna  PNVs    ALL: NKDA    FH: N/A    SH: neg x 3    ROS: As per HPI    PEX: /64   Pulse 92   Temp 98.8  F (37.1  C) (Oral)   Resp 16   Wt 113.7 kg (250 lb 10.6 oz)   SpO2 98%   BMI 41.71 kg/m    Gen: NAD, A&O x 3  Abd: obese, moderate ttp lower abdomen mainly suprapubic area, no rebound, no guarding.  Difficulty palpating uterus due to habitus.  Incision C/D/I without erythema, induration, or purulent discharge  : small amount of dark red blood on pad, no heavy flow/gush with uterine massage; feels hot to palpation  Ext: no CCE, no CT    Lab Results   Component Value Date    WBC 12.0 " 2020     Lab Results   Component Value Date    RBC 2.85 2020     Lab Results   Component Value Date    HGB 7.5 2020     Lab Results   Component Value Date    HCT 26.0 2020     No components found for: MCT  Lab Results   Component Value Date    MCV 91 2020     Lab Results   Component Value Date    MCH 29.1 2020     Lab Results   Component Value Date    MCHC 31.9 2020     Lab Results   Component Value Date    RDW 13.9 2020     Lab Results   Component Value Date     2020     Last Comprehensive Metabolic Panel:  Sodium   Date Value Ref Range Status   2020 136 133 - 144 mmol/L Final     Potassium   Date Value Ref Range Status   2020 3.7 3.4 - 5.3 mmol/L Final     Chloride   Date Value Ref Range Status   2020 106 94 - 109 mmol/L Final     Carbon Dioxide   Date Value Ref Range Status   2020 26 20 - 32 mmol/L Final     Anion Gap   Date Value Ref Range Status   2020 4 3 - 14 mmol/L Final     Glucose   Date Value Ref Range Status   2020 113 (H) 70 - 99 mg/dL Final     Urea Nitrogen   Date Value Ref Range Status   2020 13 7 - 30 mg/dL Final     Creatinine   Date Value Ref Range Status   2020 0.70 0.52 - 1.04 mg/dL Final     GFR Estimate   Date Value Ref Range Status   2020 >90 >60 mL/min/[1.73_m2] Final     Comment:     Non  GFR Calc  Starting 2018, serum creatinine based estimated GFR (eGFR) will be   calculated using the Chronic Kidney Disease Epidemiology Collaboration   (CKD-EPI) equation.       Calcium   Date Value Ref Range Status   2020 8.1 (L) 8.5 - 10.1 mg/dL Final     Lactic acid 0.7    Pelvic US: uterus 11.7 x 9.5 x 7.2 cm; images not entirely clear, however endometrium appears heterogeneous and was measured to be 50 mm.  Bilateral ovaries not visualized due to overlying bowel gas and habitus, no significant free fluid in the pelvis      A/P: 35yo  WF s/p repeat LTCS + BL  salpingectomy on 2/7/20, here for heavy VB, worsening pelvic pain.  DDx includes PP endometritis, RPOC, subinvolution of placental site.  Her VB appears to have slowed down and she appears hemodynamically stable by vitals and examination. However she does have significant discomfort with palpation suprapubic/lower abdomen, temp was 99.9 F in ER and warm to touch, and WBC mildly elevated at 12.0 with left shift.  Will empirically treat for possible PP endometritis with IV clindamycin and gentamicin.  Will follow serial Hgb overnight.  If VB should become heavy again and or Hgb continues to drop then counseled pt on possibility of suction D&C under Ultrasound guidance for possible RPOC (of note, operative report was reviewed and uterus had been exteriorized and wiped with laparotomy sponges to ensure removal of placenta membranes).  Pelvic US shows heterogeneous thickened endometrium however without vascularity, could be 2/2 endometritis and blood clots vs RPOC.  Will keep NPO for now, medications and ice chips ok.  Pt understands and agrees with plan of care, all questions answered.      LUIS KRISHNAMURTHY MD

## 2020-02-18 NOTE — PROGRESS NOTES
Feeling somewhat improved. Pelvic cramping/dull ache has lessened. Bleeding has lessened. However feeling dizzy when she is up ambulating. Has bad headache.    vss afebrile  Abdomen soft and non tender  Fundus mildly tender  Incision dry and intact  Extremities +1 edema but normal    Hgb=7.0    A: S/P repeat LTCS/BS on 2/7/20 readmitted for heavy bleeding      Bleeding has slowed down but symptomatic anemia      Tenderness may be due to endometritis or retained POC doubt the later. Most likely infection and decidual tissue.    P: Discussed blood transfusion vs iron infusion. Due to symptoms patient prefer/agreees to proceed with transfusion especially in light that she may need suction curettage.      Continue NPO for now       Continue IV antibiotics

## 2020-02-18 NOTE — PROGRESS NOTES
Bree feeling better after 1 unit RBC transfusion. Bleeding is minimal. May advance to regular diet.     Check hgb at 6pm.  Continue IV antibiotic x 48 hours  Possible discharge home in am

## 2020-02-18 NOTE — PLAN OF CARE
VS stable and pain managed with tylenol, ibuprofen and oxycodone. Vaginal bleeding is light. Patient up adlib and voiding without difficulty. Patient having adequate urine output for fluid intake.

## 2020-02-19 VITALS
OXYGEN SATURATION: 97 % | RESPIRATION RATE: 16 BRPM | BODY MASS INDEX: 41.14 KG/M2 | TEMPERATURE: 97.9 F | SYSTOLIC BLOOD PRESSURE: 131 MMHG | HEART RATE: 69 BPM | DIASTOLIC BLOOD PRESSURE: 79 MMHG | WEIGHT: 247.2 LBS

## 2020-02-19 LAB — HGB BLD-MCNC: 7.4 G/DL (ref 11.7–15.7)

## 2020-02-19 PROCEDURE — 25000125 ZZHC RX 250: Performed by: OBSTETRICS & GYNECOLOGY

## 2020-02-19 PROCEDURE — 96376 TX/PRO/DX INJ SAME DRUG ADON: CPT

## 2020-02-19 PROCEDURE — 25000132 ZZH RX MED GY IP 250 OP 250 PS 637: Performed by: OBSTETRICS & GYNECOLOGY

## 2020-02-19 PROCEDURE — 36415 COLL VENOUS BLD VENIPUNCTURE: CPT | Performed by: OBSTETRICS & GYNECOLOGY

## 2020-02-19 PROCEDURE — 96375 TX/PRO/DX INJ NEW DRUG ADDON: CPT

## 2020-02-19 PROCEDURE — 25000128 H RX IP 250 OP 636: Performed by: OBSTETRICS & GYNECOLOGY

## 2020-02-19 PROCEDURE — 25800030 ZZH RX IP 258 OP 636: Performed by: OBSTETRICS & GYNECOLOGY

## 2020-02-19 PROCEDURE — G0378 HOSPITAL OBSERVATION PER HR: HCPCS

## 2020-02-19 PROCEDURE — 85018 HEMOGLOBIN: CPT | Performed by: OBSTETRICS & GYNECOLOGY

## 2020-02-19 RX ORDER — FERROUS SULFATE 325(65) MG
325 TABLET, DELAYED RELEASE (ENTERIC COATED) ORAL DAILY
Qty: 30 TABLET | Refills: 0 | Status: SHIPPED | OUTPATIENT
Start: 2020-02-19 | End: 2020-03-20

## 2020-02-19 RX ORDER — METHYLPREDNISOLONE SODIUM SUCCINATE 125 MG/2ML
125 INJECTION, POWDER, LYOPHILIZED, FOR SOLUTION INTRAMUSCULAR; INTRAVENOUS
Status: DISCONTINUED | OUTPATIENT
Start: 2020-02-19 | End: 2020-02-19 | Stop reason: HOSPADM

## 2020-02-19 RX ORDER — AMOXICILLIN 250 MG
2 CAPSULE ORAL ONCE
Status: COMPLETED | OUTPATIENT
Start: 2020-02-19 | End: 2020-02-19

## 2020-02-19 RX ORDER — DIPHENHYDRAMINE HYDROCHLORIDE 50 MG/ML
50 INJECTION INTRAMUSCULAR; INTRAVENOUS
Status: DISCONTINUED | OUTPATIENT
Start: 2020-02-19 | End: 2020-02-19 | Stop reason: HOSPADM

## 2020-02-19 RX ORDER — AMOXICILLIN AND CLAVULANATE POTASSIUM 500; 125 MG/1; MG/1
1 TABLET, FILM COATED ORAL 3 TIMES DAILY
Qty: 21 TABLET | Refills: 0 | Status: SHIPPED | OUTPATIENT
Start: 2020-02-19 | End: 2020-02-26

## 2020-02-19 RX ADMIN — ACETAMINOPHEN 650 MG: 325 TABLET, FILM COATED ORAL at 05:37

## 2020-02-19 RX ADMIN — ACETAMINOPHEN 650 MG: 325 TABLET, FILM COATED ORAL at 11:40

## 2020-02-19 RX ADMIN — IBUPROFEN 600 MG: 600 TABLET, FILM COATED ORAL at 05:37

## 2020-02-19 RX ADMIN — IRON SUCROSE 200 MG: 20 INJECTION, SOLUTION INTRAVENOUS at 10:16

## 2020-02-19 RX ADMIN — SENNOSIDES AND DOCUSATE SODIUM 2 TABLET: 8.6; 5 TABLET ORAL at 13:08

## 2020-02-19 RX ADMIN — GENTAMICIN SULFATE 170 MG: 40 INJECTION, SOLUTION INTRAMUSCULAR; INTRAVENOUS at 06:32

## 2020-02-19 RX ADMIN — IBUPROFEN 600 MG: 600 TABLET, FILM COATED ORAL at 13:07

## 2020-02-19 RX ADMIN — OXYCODONE HYDROCHLORIDE 5 MG: 5 TABLET ORAL at 13:07

## 2020-02-19 RX ADMIN — CLINDAMYCIN PHOSPHATE 900 MG: 900 INJECTION, SOLUTION INTRAVENOUS at 04:45

## 2020-02-19 RX ADMIN — CLINDAMYCIN PHOSPHATE 900 MG: 900 INJECTION, SOLUTION INTRAVENOUS at 12:55

## 2020-02-19 RX ADMIN — ACETAMINOPHEN 650 MG: 325 TABLET, FILM COATED ORAL at 15:56

## 2020-02-19 RX ADMIN — ACETAMINOPHEN, CAFFEINE 2 TABLET: 500; 65 TABLET, FILM COATED ORAL at 08:40

## 2020-02-19 RX ADMIN — OXYCODONE HYDROCHLORIDE 5 MG: 5 TABLET ORAL at 05:37

## 2020-02-19 RX ADMIN — GENTAMICIN SULFATE 170 MG: 40 INJECTION, SOLUTION INTRAMUSCULAR; INTRAVENOUS at 14:08

## 2020-02-19 NOTE — PLAN OF CARE
VSS, pt has completed course of scheduled IV antibiotics at shift and IV iron infusion as well; had headache early at shift, resolved with excedrin PO; was taking ibuprofen, tylenol and oxycodone for abdominal discomfort, bleeding is normal with a scant rubra; pt is drinking fluids well and output is wnl; independent with cares, tolerated regular diet, pleasant, cooperative, anticipating discharge to home in the afternoon, plan of care is reviewed with pt.

## 2020-02-19 NOTE — PROGRESS NOTES
Doing well except for intermittent headache now along the left side of her head. Willing to try Excedrin Migraine.    VSS afebrile  Abdomen soft and non tender  Fundus firm and non tender  Incision dry and intact  Extremities trace edema    Hgb= 7.9 s/p 1 unit RBC    A: Endometritis resolving with IV Gent/Clinda      Bleeding now minimal      Anemia slight improvement      Headache    P: Will try Excedrin Migraine       IV iron       Discharge later today

## 2020-02-19 NOTE — DISCHARGE INSTRUCTIONS
Follow up in clinic within one week. Per Dr Loya.        Endometriosis  - Call your doctor right away if you have any of the following:  - A fever above 100.4 F (38 C), with or without chills.  - Abdominal pain and swelling that get worse.  - Pain that is not relieved by your medication.  - You have vaginal discharge that smells bad or any unusual bleeding.  - Dizziness or fainting.  - Nausea and vomiting.  - You have any questions of concerns ounce you return home.

## 2020-02-19 NOTE — PROGRESS NOTES
Headache resolved with Excedrin migraine. Notes some uterine cramping but much less compared when she was initially admitted. Has not had a BM since admission but passing gas.    VSS afebrile  Abdomen soft and nt  Fundus firm and nt    May discharge this afternoon after 2pm IV antibiotic dose. Discharge with Augmentin  Sennokot PO prior to discharge  Precautions reviewed  RTC 1 week

## 2020-02-19 NOTE — PROGRESS NOTES
Data: Vital signs within normal limits. Postpartum and infection checks within normal limits - see flow record. Patient eating and drinking normally. Patient able to empty bladder independently and is up ambulating. No apparent signs of infection. Incision healing well. Patient performing self cares.  Action: Patient medicated during the shift for pain. See MAR. Patient reassessed within 1 hour after each medication and pain was improved - patient stated she was comfortable. Patient education complete. See flow record.  Response: No questions or concerns from patient. Support persons mother present.   Plan: Discharged to home.

## 2020-02-19 NOTE — PLAN OF CARE
A&O x4. VSS, afebrile overnight. Up ad ericka. Scant amount of lochia, no clots noted. Voiding good amounts. IV SL. Tolerating PO. Pain controlled with Tylenol, Ibuprofen, and oxycodone. IV abx. C/S incision from 2/7 open to air and CDI, healing well. Possible discharge today. Will continue to monitor and provide supportive cares.

## 2020-02-19 NOTE — PROGRESS NOTES
Public Health Nurse (PHN) did not meet with patient today to discuss resources as patient previously met with PHN on 2/10/2020.

## 2020-02-20 ENCOUNTER — TELEPHONE (OUTPATIENT)
Dept: OBGYN | Facility: CLINIC | Age: 35
End: 2020-02-20

## 2020-02-22 NOTE — DISCHARGE SUMMARY
Bree is a 34 year old  who was readmitted on 20 for heavy bleeding and pain. She was s/p repeat LTCS on 20.     Bree was diagnosed with endometritis and anemia. Bleeding spontaneously slowed to minimal during her hospitalization. She improved with IV Gent/Clind.     She received 1 unit of RBC and 1 bag of IV Venofer. hgb = 7.0 prior and  = 7.9 after transfusion.     She was discharged on 20 on Augmentin and oral iron.    She will follow up in the clinic in 1 week.

## 2020-02-23 LAB
BACTERIA SPEC CULT: NO GROWTH
BACTERIA SPEC CULT: NO GROWTH
Lab: NORMAL
SPECIMEN SOURCE: NORMAL
SPECIMEN SOURCE: NORMAL

## 2020-04-01 ENCOUNTER — HOSPITAL PATHOLOGY (OUTPATIENT)
Dept: OTHER | Facility: CLINIC | Age: 35
End: 2020-04-01

## 2020-04-02 LAB — COPATH REPORT: NORMAL

## 2022-05-03 NOTE — PLAN OF CARE
Med Rec completed per patient   Allergies reviewed  No ORAL antibiotics in last 30 days         Pt s/p C/S POD11, readmission for bleeding/pain/potential infection management.  Pt on double antibiotics.  VSS and assessment WNL.  Voiding.  Pt c/o headache, managed well with medications.  Pt currently stable and progressing towards goals within the plan of care.  To continue with current plan of care and adjust accordingly.

## 2023-05-01 NOTE — PLAN OF CARE
Assessment & Plan     Chronic bilateral low back pain with bilateral sciatica  Chronic.  Has seen orthopedics in the past.  Will refer back.   Declines PT.  Wants to do pool therapy at [facility].  - Orthopedic  Referral; Future  - PRIMARY CARE FOLLOW-UP SCHEDULING; Future    Benign essential hypertension  Stable.  Continue with medication and keep cardiology updated with symptoms.  - Basic metabolic panel  (Ca, Cl, CO2, Creat, Gluc, K, Na, BUN)  - PRIMARY CARE FOLLOW-UP SCHEDULING; Future    Sleeping difficulties  Chronic and due to pain. Referred to both neurology and rheumatology.  - PRIMARY CARE FOLLOW-UP SCHEDULING; Future    Fibromyalgia  Has tried several medications in the past.  Referred for further management.  - Adult Neurology  Referral; Future  - Adult Rheumatology  Referral; Future  - PRIMARY CARE FOLLOW-UP SCHEDULING; Future    Right groin pain  New.  Likely from recent fall.  Declined PT.  Referred to orthopedics.  - Orthopedic  Referral; Future  - PRIMARY CARE FOLLOW-UP SCHEDULING; Future    Weakness of both legs  Weakness of both upper extremities  Present for 2-3 weeks and started after starting both amlodipine and carvedilol.  Working with cardiology.  - Adult Neurology  Referral; Future  - PRIMARY CARE FOLLOW-UP SCHEDULING; Future    FADUMO (generalized anxiety disorder)  Mild episode of recurrent major depressive disorder (H)  Improving.  Declines therapy or medication.      Mehnaz Lazo NP  [facility] OCTAVIO Calderon is a 72 year old, presenting for the following health issues:    Establish Care        5/1/2023    10:15 AM   Additional Questions   Roomed by Judit Rhodes   Accompanied by N/A         5/1/2023    10:15 AM   Patient Reported Additional Medications   Patient reports taking the following new medications carvedilol, amlodipine     HPI     HTN:  -Well controlled.   -Currently taking amlodipine, furosemide, and  Vital signs stable. Pain treated with Toradol and Tylenol. Breastfeeding with minimal assistance and bonding with infant. Catheter care with wipes completed and no signs/symptoms of infection. Urine output adequate (675 mL in 5 hours) after bolus on previous shift.    "carvedilol.  -Possible side effects from BP medications and/or atorvastatin so working with cardiologist for further assessment.  BP Readings from Last 6 Encounters:   05/01/23 128/80   04/26/23 132/69   04/07/23 (!) 148/84   04/07/23 (!) 154/86   03/24/23 (!) 168/90   03/24/23 (!) 181/79       Sleeping difficulties:  -Chronic.    -Tried Trazodone 25mg nightly but did not like the medication  -Worsened by back pain and fibromyalgia. Recent fall 2-3 weeks ago.    Fibromyalgia: Did see rheumatology but has not seen one in several years.  Difficulty sleeping due to pain.  Does  not like gabapentin.  Has tried amitriptyline but did not like. Has not tried Venlafaxine but not interested as it is SNRI.  Uses acetaminophen to help with pain.  Does take ibuprofen as needed.    Feels generalized weakness in upper and lower extremities.  Started after she started both medication (amlodipine and carvedilol). Just saw cardiologist and rearranged the timing of medication.  Denies numbness or tingling.     Chronic low back pain.  Intermittent sciatica.  Has seen orthopedics in the past but not for several years.    Fall 2-3 weeks ago at [employer].  States she was getting out of her chair and her left leg \"gave out\".  She fell on her hands and right knee.  No LOC.  Did not hit her head.    Right groin pain:  -Started one week ago  -Hurts when laying down and stretching  -Sharp pain  -Fell at [employer] from her chair.  -Did join the [facility] and will start doing pool therapy.    Anxiety and depression.  Has improved. Declines medication and therapy.    Review of Systems   Constitutional, HEENT, cardiovascular, pulmonary, gi and gu systems are negative, except as otherwise noted.      Objective    /80 (BP Location: Right arm, Patient Position: Sitting, Cuff Size: Adult Large)   Pulse 60   Temp 98.7  F (37.1  C) (Tympanic)   Resp 16   Ht 1.594 m (5' 2.75\")   Wt 80.8 kg (178 lb 3.2 oz)   SpO2 98%   BMI 31.82 kg/m    Body mass " index is 31.82 kg/m .       Physical Exam   GENERAL: healthy, alert and no distress  RESP: lungs clear to auscultation - no rales, rhonchi or wheezes  CV: regular rate and rhythm, normal S1 S2, no S3 or S4, no murmur, click or rub, no peripheral edema and peripheral pulses strong  MS: RLE exam shows normal strength and muscle mass, no deformities, ROM of all joints is normal and tenderness upon palpation of right groin.  NEURO: Normal strength and tone, mentation intact and speech normal  Comprehensive back pain exam:  Tenderness of bilateral low back, Range of motion not limited by pain, Lower extremity strength functional and equal on both sides, Lower extremity sensation normal and equal on both sides and Straight leg raise negative bilaterally  PSYCH: mentation appears normal, affect normal/bright

## 2023-07-17 ENCOUNTER — OFFICE VISIT (OUTPATIENT)
Dept: URGENT CARE | Facility: URGENT CARE | Age: 38
End: 2023-07-17
Payer: COMMERCIAL

## 2023-07-17 VITALS
WEIGHT: 222.7 LBS | SYSTOLIC BLOOD PRESSURE: 116 MMHG | RESPIRATION RATE: 24 BRPM | DIASTOLIC BLOOD PRESSURE: 76 MMHG | OXYGEN SATURATION: 99 % | BODY MASS INDEX: 37.06 KG/M2 | HEART RATE: 76 BPM | TEMPERATURE: 98.7 F

## 2023-07-17 DIAGNOSIS — L03.011 CELLULITIS OF RIGHT INDEX FINGER: Primary | ICD-10-CM

## 2023-07-17 PROCEDURE — 99203 OFFICE O/P NEW LOW 30 MIN: CPT | Performed by: PHYSICIAN ASSISTANT

## 2023-07-17 RX ORDER — CEPHALEXIN 500 MG/1
500 CAPSULE ORAL 3 TIMES DAILY
Qty: 21 CAPSULE | Refills: 0 | Status: SHIPPED | OUTPATIENT
Start: 2023-07-17 | End: 2023-07-24

## 2023-07-17 ASSESSMENT — PAIN SCALES - GENERAL: PAINLEVEL: MILD PAIN (3)

## 2023-07-17 NOTE — PROGRESS NOTES
URGENT CARE VISIT:    SUBJECTIVE:   Chief Complaint   Patient presents with     Finger      Bree Alexis is a 37 year old female who presents with a chief complaint of right index finger pain, swelling and redness.  Symptoms began 2 day(s) ago, are mild and moderate and gradual onset  It started after she got cut by   Pain exacerbated by touch. Relieved by rest.  She treated it initially with no therapy. This is not the first time this type of injury has occurred to this patient.     PMH: History reviewed. No pertinent past medical history.  Allergies: Patient has no known allergies.   Medications:   Current Outpatient Medications   Medication Sig Dispense Refill     cephALEXin (KEFLEX) 500 MG capsule Take 1 capsule (500 mg) by mouth 3 times daily for 7 days 21 capsule 0     acetaminophen (TYLENOL) 325 MG tablet Take 2 tablets (650 mg) by mouth every 6 hours as needed for other (multimodal surgical pain management along with NSAIDS and opioid medication as indicated based on pain control and physical function.) (Patient not taking: Reported on 2023) 1 Bottle 0     ibuprofen (ADVIL/MOTRIN) 800 MG tablet Take 1 tablet (800 mg) by mouth every 8 hours as needed for other (cramping) (Patient not taking: Reported on 2023) 20 tablet 0     Social History:   Social History     Tobacco Use     Smoking status: Former     Packs/day: 0.50     Years: 6.00     Pack years: 3.00     Types: Cigarettes     Quit date: 2015     Years since quittin.4     Smokeless tobacco: Never   Substance Use Topics     Alcohol use: No       ROS:  Review of systems negative except as stated above.    OBJECTIVE:  /76 (BP Location: Right arm, Patient Position: Sitting, Cuff Size: Adult Regular)   Pulse 76   Temp 98.7  F (37.1  C) (Oral)   Resp 24   Wt 101 kg (222 lb 11.2 oz)   LMP 2023 (Approximate)   SpO2 99%   Breastfeeding No   BMI 37.06 kg/m    GENERAL APPEARANCE: healthy, alert and no  distress  MUSCULOSKELETAL: moderate TTP over right 2nd pip joint. FROM.   EXTREMITIES: peripheral pulses normal  SKIN: moderate edema and erythema over right 2nd PIP joint  NEURO: sensation intact     Procedure:  2 ml of lidocaine 1% injected into right 2nd pip joint with good anesthesia. 18 gauge needle was used to probe central umbilication. Bloody discharge without purulent drainage extracted. band aid applied.       ASSESSMENT:    ICD-10-CM    1. Cellulitis of right index finger  L03.011 cephALEXin (KEFLEX) 500 MG capsule     DRAIN SKIN ABSCESS SIMPLE/SINGLE          PLAN:  Patient Instructions   Patient was educated on the natural course of condition.  Conservative measures discussed including warm compresses, and over-the-counter analgesics (Tylenol or Ibuprofen).  Keep wound clean and dry. See your primary care provider if symptoms worsen or do not improve in 7 days. Seek emergency care if you develop fever, severe swelling, or increased redness.     Patient verbalized understanding and is agreeable to plan. The patient was discharged ambulatory and in stable condition.    Bailee Marie PA-C on 7/17/2023 at 4:39 PM

## (undated) DEVICE — LINEN DRAPE 54X72" 5467

## (undated) DEVICE — SOL WATER IRRIG 1000ML BOTTLE 2F7114

## (undated) DEVICE — SUCTION CANISTER MEDIVAC LINER 3000ML W/LID 65651-530

## (undated) DEVICE — STPL SKIN SUBCUTICULAR INSORB  2030

## (undated) DEVICE — STOCKING SLEEVE VASOPRESS COMPRESSION CALF MED 18" VP501M

## (undated) DEVICE — PACK C-SECTION LF PL15OTA83B

## (undated) DEVICE — LINEN TOWEL PACK X10 5473

## (undated) DEVICE — SU VICRYL 0 CT-1 36" J346H

## (undated) DEVICE — LINEN BABY BLANKET 5434

## (undated) DEVICE — ESU LIGASURE OPEN SEALER/DIVIDER SM JAW 16.5MM LF1212A

## (undated) DEVICE — SOLIDIFIER FLUID RED 1500ML LIQUID KIT SYS POWDER ISOB1500

## (undated) DEVICE — GLOVE PROTEXIS MICRO 6.5  2D73PM65

## (undated) DEVICE — TRANSFER DEVICE BLOOD NDL HOLDER 364880

## (undated) DEVICE — GLOVE PROTEXIS BLUE W/NEU-THERA 6.5  2D73EB65

## (undated) DEVICE — DRAPE IOBAN C-SECTION W/POUCH 30X35" 6657

## (undated) DEVICE — CATH TRAY FOLEY SURESTEP 16FR DRAIN BAG STATOCK A899916

## (undated) DEVICE — SU VICRYL 2-0 CT-1 36" J345H

## (undated) DEVICE — ESU GROUND PAD ADULT W/CORD E7507

## (undated) DEVICE — PREP CHLORAPREP 26ML TINTED ORANGE  260815

## (undated) DEVICE — BAG CLEAR TRASH 1.3M 39X33" P4040C

## (undated) DEVICE — SUCTION KIWI VAC  VAC-6000M

## (undated) DEVICE — SOL NACL 0.9% IRRIG 1000ML BOTTLE 2F7124

## (undated) DEVICE — DRSG TEGADERM 4X10" 1627

## (undated) DEVICE — LINEN FULL SHEET 5511

## (undated) DEVICE — PAD CHUX UNDERPAD 30X36" P3036C

## (undated) DEVICE — BLADE CLIPPER SGL USE 9680

## (undated) DEVICE — LINEN HALF SHEET 5512

## (undated) DEVICE — CAP BABY PINK/BLUE IC-2

## (undated) RX ORDER — PHENYLEPHRINE HCL IN 0.9% NACL 1 MG/10 ML
SYRINGE (ML) INTRAVENOUS
Status: DISPENSED
Start: 2020-02-07

## (undated) RX ORDER — FENTANYL CITRATE 50 UG/ML
INJECTION, SOLUTION INTRAMUSCULAR; INTRAVENOUS
Status: DISPENSED
Start: 2020-02-07

## (undated) RX ORDER — DEXAMETHASONE SODIUM PHOSPHATE 4 MG/ML
INJECTION, SOLUTION INTRA-ARTICULAR; INTRALESIONAL; INTRAMUSCULAR; INTRAVENOUS; SOFT TISSUE
Status: DISPENSED
Start: 2020-02-07

## (undated) RX ORDER — MORPHINE SULFATE 1 MG/ML
INJECTION, SOLUTION EPIDURAL; INTRATHECAL; INTRAVENOUS
Status: DISPENSED
Start: 2020-02-07

## (undated) RX ORDER — ONDANSETRON 2 MG/ML
INJECTION INTRAMUSCULAR; INTRAVENOUS
Status: DISPENSED
Start: 2020-02-07

## (undated) RX ORDER — OXYTOCIN/0.9 % SODIUM CHLORIDE 30/500 ML
PLASTIC BAG, INJECTION (ML) INTRAVENOUS
Status: DISPENSED
Start: 2020-02-07